# Patient Record
Sex: FEMALE | Race: WHITE | Employment: FULL TIME | URBAN - METROPOLITAN AREA
[De-identification: names, ages, dates, MRNs, and addresses within clinical notes are randomized per-mention and may not be internally consistent; named-entity substitution may affect disease eponyms.]

---

## 2018-04-19 ENCOUNTER — OFFICE VISIT (OUTPATIENT)
Dept: OBGYN CLINIC | Age: 28
End: 2018-04-19
Payer: COMMERCIAL

## 2018-04-19 ENCOUNTER — HOSPITAL ENCOUNTER (OUTPATIENT)
Age: 28
Setting detail: SPECIMEN
Discharge: HOME | End: 2018-04-19
Payer: COMMERCIAL

## 2018-04-19 VITALS
SYSTOLIC BLOOD PRESSURE: 131 MMHG | WEIGHT: 174.6 LBS | HEART RATE: 61 BPM | DIASTOLIC BLOOD PRESSURE: 80 MMHG | BODY MASS INDEX: 26.46 KG/M2 | HEIGHT: 68 IN

## 2018-04-19 DIAGNOSIS — R87.612 LGSIL OF CERVIX OF UNDETERMINED SIGNIFICANCE: Primary | ICD-10-CM

## 2018-04-19 DIAGNOSIS — Z32.02 NEGATIVE PREGNANCY TEST: ICD-10-CM

## 2018-04-19 LAB
CONTROL: PRESENT
PREGNANCY TEST URINE, POC: NEGATIVE

## 2018-04-19 PROCEDURE — 57456 ENDOCERV CURETTAGE W/SCOPE: CPT | Performed by: OBSTETRICS & GYNECOLOGY

## 2018-04-19 PROCEDURE — 81025 URINE PREGNANCY TEST: CPT | Performed by: OBSTETRICS & GYNECOLOGY

## 2018-04-19 RX ORDER — LABETALOL 200 MG/1
200 TABLET, FILM COATED ORAL DAILY
COMMUNITY
End: 2021-03-02 | Stop reason: SDUPTHER

## 2018-04-24 LAB — SURGICAL PATHOLOGY REPORT: NORMAL

## 2019-10-28 ENCOUNTER — HOSPITAL ENCOUNTER (OUTPATIENT)
Age: 29
Discharge: HOME | End: 2019-10-28
Payer: COMMERCIAL

## 2019-10-28 LAB — RUBV IGG SER QL: 192.2 IU/ML

## 2019-10-28 PROCEDURE — 86762 RUBELLA ANTIBODY: CPT

## 2019-10-28 PROCEDURE — 86765 RUBEOLA ANTIBODY: CPT

## 2019-10-28 PROCEDURE — 86735 MUMPS ANTIBODY: CPT

## 2019-10-28 PROCEDURE — 86481 TB AG RESPONSE T-CELL SUSP: CPT

## 2019-10-28 PROCEDURE — 86787 VARICELLA-ZOSTER ANTIBODY: CPT

## 2019-10-30 LAB
MEASLES IMMUNE (IGG): 5.84
MUV IGG SER QL: 5.81
VZV IGG SER QL IA: 1.92

## 2019-11-01 LAB — T-SPOT TB TEST: NORMAL

## 2020-02-03 ENCOUNTER — OFFICE VISIT (OUTPATIENT)
Dept: OBGYN CLINIC | Age: 30
End: 2020-02-03
Payer: COMMERCIAL

## 2020-02-03 ENCOUNTER — HOSPITAL ENCOUNTER (OUTPATIENT)
Age: 30
Setting detail: SPECIMEN
Discharge: HOME | End: 2020-02-03
Payer: COMMERCIAL

## 2020-02-03 VITALS
BODY MASS INDEX: 27.13 KG/M2 | HEART RATE: 74 BPM | WEIGHT: 179 LBS | DIASTOLIC BLOOD PRESSURE: 80 MMHG | HEIGHT: 68 IN | SYSTOLIC BLOOD PRESSURE: 130 MMHG

## 2020-02-03 PROCEDURE — 99395 PREV VISIT EST AGE 18-39: CPT | Performed by: ADVANCED PRACTICE MIDWIFE

## 2020-02-03 ASSESSMENT — ENCOUNTER SYMPTOMS
GASTROINTESTINAL NEGATIVE: 1
RESPIRATORY NEGATIVE: 1
EYES NEGATIVE: 1
ALLERGIC/IMMUNOLOGIC NEGATIVE: 1

## 2020-02-03 NOTE — PROGRESS NOTES
Subjective:      Patient ID: Jamie Oliver is a 26 y.o. female. Arias Eddy is here today for her annual gyn exam.  She is doing well. Takes Lo Loestrin for OCP cycle control. Takes continuous. Working at [employer] 25 now. Review of Systems   Constitutional: Negative. HENT: Negative. Eyes: Negative. Respiratory: Negative. Cardiovascular: Negative. Gastrointestinal: Negative. Endocrine: Negative. Genitourinary: Negative. Musculoskeletal: Negative. Skin: Negative. Allergic/Immunologic: Negative. Neurological: Negative. Hematological: Negative. Psychiatric/Behavioral: Negative. Objective:   Physical Exam  Constitutional:       Appearance: She is well-developed. HENT:      Head: Normocephalic and atraumatic. Eyes:      Pupils: Pupils are equal, round, and reactive to light. Neck:      Musculoskeletal: Normal range of motion and neck supple. Thyroid: No thyromegaly. Trachea: No tracheal deviation. Cardiovascular:      Rate and Rhythm: Normal rate and regular rhythm. Heart sounds: Normal heart sounds. Pulmonary:      Effort: Pulmonary effort is normal.      Breath sounds: Normal breath sounds. Chest:      Breasts: Breasts are symmetrical.         Right: No mass, nipple discharge, skin change or tenderness. Left: No mass, nipple discharge, skin change or tenderness. Abdominal:      Palpations: Abdomen is soft. Tenderness: There is no abdominal tenderness. Genitourinary:     Exam position: Supine. Labia:         Right: No rash, tenderness, lesion or injury. Left: No rash, tenderness, lesion or injury. Vagina: Normal. No vaginal discharge, erythema, tenderness or bleeding. Cervix: No cervical motion tenderness or discharge. Uterus: Not tender. Adnexa:         Right: No mass or tenderness. Left: No mass or tenderness. Rectum: Normal.   Musculoskeletal: Normal range of motion.

## 2020-02-18 LAB — CYTOLOGY REPORT: NORMAL

## 2020-09-03 ENCOUNTER — EMPLOYEE WELLNESS (OUTPATIENT)
Dept: OTHER | Age: 30
End: 2020-09-03

## 2020-09-03 LAB
CHOLESTEROL/HDL RATIO: 4.7
CHOLESTEROL: 185 MG/DL
GLUCOSE BLD-MCNC: 94 MG/DL (ref 70–99)
HDLC SERPL-MCNC: 39 MG/DL
LDL CHOLESTEROL: 107 MG/DL (ref 0–130)
PATIENT FASTING?: YES
TRIGL SERPL-MCNC: 197 MG/DL
VLDLC SERPL CALC-MCNC: ABNORMAL MG/DL (ref 1–30)

## 2020-10-19 VITALS — BODY MASS INDEX: 27.37 KG/M2 | WEIGHT: 180 LBS

## 2021-01-11 ENCOUNTER — OFFICE VISIT (OUTPATIENT)
Dept: OBGYN CLINIC | Age: 31
End: 2021-01-11
Payer: COMMERCIAL

## 2021-01-11 ENCOUNTER — HOSPITAL ENCOUNTER (OUTPATIENT)
Age: 31
Setting detail: SPECIMEN
Discharge: HOME | End: 2021-01-11
Payer: COMMERCIAL

## 2021-01-11 VITALS
DIASTOLIC BLOOD PRESSURE: 95 MMHG | SYSTOLIC BLOOD PRESSURE: 145 MMHG | HEIGHT: 68 IN | HEART RATE: 65 BPM | BODY MASS INDEX: 27.57 KG/M2 | WEIGHT: 181.9 LBS | TEMPERATURE: 97.9 F

## 2021-01-11 DIAGNOSIS — Z11.3 SCREENING FOR STDS (SEXUALLY TRANSMITTED DISEASES): ICD-10-CM

## 2021-01-11 DIAGNOSIS — Z11.3 SCREENING FOR STDS (SEXUALLY TRANSMITTED DISEASES): Primary | ICD-10-CM

## 2021-01-11 PROCEDURE — 99213 OFFICE O/P EST LOW 20 MIN: CPT | Performed by: ADVANCED PRACTICE MIDWIFE

## 2021-01-11 NOTE — PROGRESS NOTES
Denice Harley is here today for evaluation for STDs. Her SO had some dysuria and went to urgent care. They automatically screened him for STDs and told him to have his partner checked also. They have been together for 5 years and neither admits to infidelity since they have been together. She denies any symptoms. She called this am to get in. Cultures were obtained. Will await results and follow as needed.

## 2021-01-12 ENCOUNTER — TELEPHONE (OUTPATIENT)
Dept: OBGYN CLINIC | Age: 31
End: 2021-01-12

## 2021-01-12 DIAGNOSIS — A74.9 CHLAMYDIA: Primary | ICD-10-CM

## 2021-01-12 DIAGNOSIS — N76.0 ACUTE VAGINITIS: ICD-10-CM

## 2021-01-12 LAB
C. TRACHOMATIS DNA ,URINE: ABNORMAL
DIRECT EXAM: NORMAL
Lab: NORMAL
N. GONORRHOEAE DNA, URINE: NEGATIVE
SPECIMEN DESCRIPTION: ABNORMAL
SPECIMEN DESCRIPTION: NORMAL

## 2021-01-12 RX ORDER — FLUCONAZOLE 150 MG/1
150 TABLET ORAL ONCE
Qty: 1 TABLET | Refills: 0 | Status: SHIPPED | OUTPATIENT
Start: 2021-01-12 | End: 2021-01-12

## 2021-01-12 RX ORDER — AZITHROMYCIN 500 MG/1
1000 TABLET, FILM COATED ORAL ONCE
Qty: 2 TABLET | Refills: 0 | Status: SHIPPED | OUTPATIENT
Start: 2021-01-12 | End: 2021-01-12

## 2021-01-12 NOTE — TELEPHONE ENCOUNTER
Pt calling for results of labs from yesterday. 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

## 2021-02-10 ENCOUNTER — HOSPITAL ENCOUNTER (OUTPATIENT)
Age: 31
Setting detail: SPECIMEN
Discharge: HOME | End: 2021-02-10
Payer: COMMERCIAL

## 2021-02-10 ENCOUNTER — OFFICE VISIT (OUTPATIENT)
Dept: OBGYN CLINIC | Age: 31
End: 2021-02-10
Payer: COMMERCIAL

## 2021-02-10 VITALS
WEIGHT: 184.3 LBS | TEMPERATURE: 97.7 F | DIASTOLIC BLOOD PRESSURE: 82 MMHG | HEIGHT: 68 IN | SYSTOLIC BLOOD PRESSURE: 130 MMHG | HEART RATE: 66 BPM | BODY MASS INDEX: 27.93 KG/M2

## 2021-02-10 DIAGNOSIS — Z11.3 SCREEN FOR SEXUALLY TRANSMITTED DISEASES: ICD-10-CM

## 2021-02-10 DIAGNOSIS — Z23 NEED FOR TDAP VACCINATION: ICD-10-CM

## 2021-02-10 DIAGNOSIS — Z01.419 WELL WOMAN EXAM WITH ROUTINE GYNECOLOGICAL EXAM: Primary | ICD-10-CM

## 2021-02-10 PROCEDURE — 90715 TDAP VACCINE 7 YRS/> IM: CPT | Performed by: ADVANCED PRACTICE MIDWIFE

## 2021-02-10 PROCEDURE — 90471 IMMUNIZATION ADMIN: CPT | Performed by: ADVANCED PRACTICE MIDWIFE

## 2021-02-10 PROCEDURE — 99395 PREV VISIT EST AGE 18-39: CPT | Performed by: ADVANCED PRACTICE MIDWIFE

## 2021-02-10 SDOH — ECONOMIC STABILITY: TRANSPORTATION INSECURITY
IN THE PAST 12 MONTHS, HAS LACK OF TRANSPORTATION KEPT YOU FROM MEETINGS, WORK, OR FROM GETTING THINGS NEEDED FOR DAILY LIVING?: NO

## 2021-02-10 SDOH — ECONOMIC STABILITY: FOOD INSECURITY: WITHIN THE PAST 12 MONTHS, THE FOOD YOU BOUGHT JUST DIDN'T LAST AND YOU DIDN'T HAVE MONEY TO GET MORE.: NEVER TRUE

## 2021-02-10 SDOH — ECONOMIC STABILITY: TRANSPORTATION INSECURITY
IN THE PAST 12 MONTHS, HAS THE LACK OF TRANSPORTATION KEPT YOU FROM MEDICAL APPOINTMENTS OR FROM GETTING MEDICATIONS?: NO

## 2021-02-10 ASSESSMENT — ANXIETY QUESTIONNAIRES
7. FEELING AFRAID AS IF SOMETHING AWFUL MIGHT HAPPEN: 0-NOT AT ALL
4. TROUBLE RELAXING: 0-NOT AT ALL
1. FEELING NERVOUS, ANXIOUS, OR ON EDGE: 0-NOT AT ALL
6. BECOMING EASILY ANNOYED OR IRRITABLE: 0-NOT AT ALL

## 2021-02-10 ASSESSMENT — PATIENT HEALTH QUESTIONNAIRE - PHQ9
1. LITTLE INTEREST OR PLEASURE IN DOING THINGS: 0
SUM OF ALL RESPONSES TO PHQ QUESTIONS 1-9: 0

## 2021-02-10 NOTE — PROGRESS NOTES
After obtaining consent, and per orders of [facility] CNM, injection of Boostrix Tdap given in Left deltoid by Bhupendra Hensley. Patient instructed to remain in clinic for 20 minutes afterwards, and to report any adverse reaction to me immediately.
Tenderness: There is no abdominal tenderness. Genitourinary:     Exam position: Supine. Labia:         Right: No rash, tenderness, lesion or injury. Left: No rash, tenderness, lesion or injury. Vagina: Normal. No vaginal discharge, erythema, tenderness or bleeding. Cervix: No cervical motion tenderness or discharge. Uterus: Not tender. Adnexa:         Right: No mass or tenderness. Left: No mass or tenderness. Rectum: Normal.   Musculoskeletal: Normal range of motion. Lymphadenopathy:      Cervical: No cervical adenopathy. Lower Body: No right inguinal adenopathy. No left inguinal adenopathy. Skin:     General: Skin is warm and dry. Findings: No rash. Neurological:      Mental Status: She is alert and oriented to person, place, and time. Psychiatric:         Behavior: Behavior normal.         Thought Content: Thought content normal.         Judgment: Judgment normal.           An electronic signature was used to authenticate this note.     --TREY Diaz CNM

## 2021-02-11 DIAGNOSIS — Z11.3 SCREEN FOR SEXUALLY TRANSMITTED DISEASES: ICD-10-CM

## 2021-02-11 LAB
CHLAMYDIA BY THIN PREP: NEGATIVE
N. GONORRHOEAE DNA, THIN PREP: NEGATIVE
SPECIMEN DESCRIPTION: NORMAL

## 2021-02-12 LAB
HPV SAMPLE: NORMAL
HPV, GENOTYPE 16: NOT DETECTED
HPV, GENOTYPE 18: NOT DETECTED
HPV, HIGH RISK OTHER: NOT DETECTED
HPV, INTERPRETATION: NORMAL
SPECIMEN DESCRIPTION: NORMAL

## 2021-02-22 LAB — CYTOLOGY REPORT: NORMAL

## 2021-03-02 ENCOUNTER — OFFICE VISIT (OUTPATIENT)
Dept: PRIMARY CARE CLINIC | Age: 31
End: 2021-03-02
Payer: COMMERCIAL

## 2021-03-02 ENCOUNTER — HOSPITAL ENCOUNTER (OUTPATIENT)
Age: 31
Setting detail: SPECIMEN
Discharge: HOME | End: 2021-03-02
Payer: COMMERCIAL

## 2021-03-02 VITALS
WEIGHT: 186.4 LBS | TEMPERATURE: 96.7 F | BODY MASS INDEX: 28.25 KG/M2 | DIASTOLIC BLOOD PRESSURE: 82 MMHG | HEART RATE: 79 BPM | OXYGEN SATURATION: 99 % | SYSTOLIC BLOOD PRESSURE: 138 MMHG | HEIGHT: 68 IN

## 2021-03-02 DIAGNOSIS — Z13.220 SCREENING FOR HYPERLIPIDEMIA: ICD-10-CM

## 2021-03-02 DIAGNOSIS — M72.2 PLANTAR FASCIITIS: ICD-10-CM

## 2021-03-02 DIAGNOSIS — Z13.1 SCREENING FOR DIABETES MELLITUS: ICD-10-CM

## 2021-03-02 DIAGNOSIS — K21.9 GASTROESOPHAGEAL REFLUX DISEASE WITHOUT ESOPHAGITIS: ICD-10-CM

## 2021-03-02 DIAGNOSIS — I10 ESSENTIAL HYPERTENSION: Primary | Chronic | ICD-10-CM

## 2021-03-02 PROBLEM — L42 PITYRIASIS ROSEA: Status: ACTIVE | Noted: 2021-03-02

## 2021-03-02 PROBLEM — G43.009 MIGRAINE WITHOUT AURA, NOT REFRACTORY: Status: RESOLVED | Noted: 2021-03-02 | Resolved: 2021-03-02

## 2021-03-02 PROBLEM — G43.009 MIGRAINE WITHOUT AURA, NOT REFRACTORY: Status: ACTIVE | Noted: 2021-03-02

## 2021-03-02 LAB
ANION GAP SERPL CALCULATED.3IONS-SCNC: 9 MMOL/L (ref 9–17)
BUN BLDV-MCNC: 12 MG/DL (ref 6–20)
BUN/CREAT BLD: ABNORMAL (ref 9–20)
CALCIUM SERPL-MCNC: 9.2 MG/DL (ref 8.6–10.4)
CHLORIDE BLD-SCNC: 101 MMOL/L (ref 98–107)
CHOLESTEROL/HDL RATIO: 4.9
CHOLESTEROL: 193 MG/DL
CO2: 23 MMOL/L (ref 20–31)
CREAT SERPL-MCNC: 0.71 MG/DL (ref 0.5–0.9)
GFR AFRICAN AMERICAN: >60 ML/MIN
GFR NON-AFRICAN AMERICAN: >60 ML/MIN
GFR SERPL CREATININE-BSD FRML MDRD: ABNORMAL ML/MIN/{1.73_M2}
GFR SERPL CREATININE-BSD FRML MDRD: ABNORMAL ML/MIN/{1.73_M2}
GLUCOSE BLD-MCNC: 106 MG/DL (ref 70–99)
HDLC SERPL-MCNC: 39 MG/DL
LDL CHOLESTEROL: 116 MG/DL (ref 0–130)
POTASSIUM SERPL-SCNC: 4.3 MMOL/L (ref 3.7–5.3)
SODIUM BLD-SCNC: 133 MMOL/L (ref 135–144)
TRIGL SERPL-MCNC: 191 MG/DL
VLDLC SERPL CALC-MCNC: ABNORMAL MG/DL (ref 1–30)

## 2021-03-02 PROCEDURE — 99203 OFFICE O/P NEW LOW 30 MIN: CPT | Performed by: NURSE PRACTITIONER

## 2021-03-02 RX ORDER — LABETALOL 200 MG/1
200 TABLET, FILM COATED ORAL DAILY
Qty: 30 TABLET | Refills: 3 | Status: SHIPPED | OUTPATIENT
Start: 2021-03-02 | End: 2021-09-20 | Stop reason: SDUPTHER

## 2021-03-02 RX ORDER — HYDROCHLOROTHIAZIDE 25 MG/1
12.5 TABLET ORAL DAILY
Qty: 30 TABLET | Refills: 3 | Status: SHIPPED | OUTPATIENT
Start: 2021-03-02 | End: 2021-08-17

## 2021-03-02 ASSESSMENT — ENCOUNTER SYMPTOMS
RESPIRATORY NEGATIVE: 1
ABDOMINAL PAIN: 1
EYES NEGATIVE: 1

## 2021-03-02 NOTE — PROGRESS NOTES
Emily Gonzales [address] [place] [address]  Hannah Jenkins [id number]  Dept: [phone]     Patient is here to establish care. Here for evaluation of the following medical concerns:New Patient and Hypertension    Jennifer Aldrich is a 27 y.o. female who presents to the office today for a first visit and to establish a relationship with a new primary care provider. Pt here to establish care. She has HTN and has been on labetalol BID but notices she gets dizzy/lightheaded when taking it BID. Her pulse rate tends to run on the lower side. Pt is considering pregnancy soon so her previous PCP elected for a BP med that would be safe for pregnancy. Her BP is borderline in office, she has taken the labetalol this morning. We discussed decreasing labetalol to once daily and adding 12.5mg hctz and having pt monitor her readings, pt agreeable. She also has noticed left heel pain, intermittently, some days worse than others. She does work 12-13 hour shifts and does a lot of standing. No injury. Discussed plantar fascitis and gave recommendations for good sole support as well as exercises she can do. Pt notices within last few weeks she is getting abdominal cramping after eating meals. She hasn't noticed any specific food that triggers it, but she does eat a lot of dairy. She recently started taking a probiotic and metamucil so will see if symptoms improve with those. Denies constipation that she knows of, has BM daily, denies diarrhea. Has GERD which is managed with daily nexium. Pt also due for labwork. Will start new meds, get labs and f/u in 2 weeks, pt understands and agreeable to plan, will call me in meantime with any questions or issues.      Patient Active Problem List    Diagnosis Date Noted    Essential hypertension 03/02/2021    Pityriasis rosea 03/02/2021    Gastroesophageal reflux disease without esophagitis 03/02/2021    Plantar fasciitis 03/02/2021     Patient's BMI is Body mass index is 28.34 kg/m². Social History     Tobacco Use    Smoking status: Never Smoker    Smokeless tobacco: Never Used   Substance Use Topics    Alcohol use: No    Drug use: No      Past Medical History:   Diagnosis Date    Dislocation, hip ([address]     Murmur     Vasovagal near syncope       No data recorded   Immunization:  Immunization History   Administered Date(s) Administered    Hepatitis B Ped/Adol (Engerix-B, Recombivax HB) 03/29/1996, 06/06/1996, 02/07/1997    Influenza Virus Vaccine 07/30/2015, 08/29/2016    Influenza, Quadv, IM, PF (6 mo and older Fluzone, Flulaval, Fluarix, and 3 yrs and older Afluria) 10/21/2019, 09/21/2020    MMR 03/24/1999    Td vaccine (adult) 12/03/2002    Tdap (Boostrix, Adacel) 11/04/2019, 02/10/2021     History reviewed. No pertinent surgical history. Family History   Problem Relation Age of Onset    High Blood Pressure Mother     High Blood Pressure Father     Other Father 27        [facility]     Current Outpatient Medications   Medication Sig Dispense Refill    labetalol (NORMODYNE) 200 MG tablet Take 1 tablet by mouth daily 30 tablet 3    hydroCHLOROthiazide (HYDRODIURIL) 25 MG tablet Take 0.5 tablets by mouth daily 30 tablet 3    esomeprazole (NEXIUM) 20 MG delayed release capsule Take 1 capsule by mouth daily 30 capsule 3    Cetirizine HCl (ZYRTEC PO) Take by mouth      Probiotic Product (PROBIOTIC PO) Take by mouth      norethindrone-ethinyl estradiol-Fe (LO LOESTRIN FE) 1 MG-10 MCG / 10 MCG tablet Take 1 tablet by mouth daily 28 tablet 12    ibuprofen (ADVIL;MOTRIN) 800 MG tablet Take 800 mg by mouth every 6 hours as needed for Pain       No current facility-administered medications for this visit. The patient's past medical, surgical, social, and family history as well as her current medications and allergies were reviewed as documented in today's encounter. Review of Systems   Constitutional: Negative. HENT: Negative. Eyes: Negative. Respiratory: Negative. Cardiovascular: Negative. Gastrointestinal: Positive for abdominal pain (intermittent cramping after eating ). Endocrine: Negative. Genitourinary: Negative. Musculoskeletal: Positive for myalgias (left heel pain). Skin: Negative. Allergic/Immunologic: Positive for environmental allergies. Neurological: Negative. Hematological: Negative. Psychiatric/Behavioral: Negative. All other systems reviewed and are negative. /82 (Site: Left Upper Arm, Position: Sitting, Cuff Size: Medium Adult)   Pulse 79   Temp 96.7 °F (35.9 °C) (Temporal)   Ht 5' 8\" (1.727 m)   Wt 186 lb 6.4 oz (84.6 kg)   SpO2 99%   BMI 28.34 kg/m²   Physical Exam  Vitals signs and nursing note reviewed. Constitutional:       Appearance: Normal appearance. HENT:      Right Ear: Tympanic membrane, ear canal and external ear normal.      Left Ear: Tympanic membrane, ear canal and external ear normal.      Nose: Nose normal.      Mouth/Throat:      Mouth: Mucous membranes are moist.      Pharynx: No oropharyngeal exudate. Eyes:      Extraocular Movements: Extraocular movements intact. Conjunctiva/sclera: Conjunctivae normal.      Pupils: Pupils are equal, round, and reactive to light. Neck:      Musculoskeletal: Normal range of motion. Cardiovascular:      Rate and Rhythm: Normal rate and regular rhythm. Pulses: Normal pulses. Heart sounds: Normal heart sounds. Pulmonary:      Effort: Pulmonary effort is normal. No respiratory distress. Breath sounds: Normal breath sounds. No stridor. No wheezing. Abdominal:      General: Abdomen is flat. Bowel sounds are normal. There is no distension. Palpations: Abdomen is soft. There is no mass. Tenderness: There is no abdominal tenderness. There is no rebound. Musculoskeletal: Normal range of motion. Skin:     General: Skin is warm and dry. Capillary Refill: Capillary refill takes less than 2 seconds. Neurological:      General: No focal deficit present. Mental Status: She is alert. Psychiatric:         Mood and Affect: Mood normal.         Behavior: Behavior normal.         Thought Content: Thought content normal.       Lab Results   Component Value Date    WBC 6.5 03/30/2016    HGB 12.3 03/30/2016    HCT 38.8 03/30/2016    MCV 90.2 03/30/2016     03/30/2016     Lab Results   Component Value Date     03/30/2016    K 4.4 03/30/2016     03/30/2016    CO2 24 03/30/2016    BUN 14 03/30/2016    CREATININE 0.77 03/30/2016    GLUCOSE 94 09/03/2020    CALCIUM 9.1 03/30/2016      No results found for: ALT, AST, GGT, ALKPHOS, BILITOT  No results found for: TSHFT4, TSH  Lab Results   Component Value Date    CHOL 185 09/03/2020     Lab Results   Component Value Date    TRIG 197 (H) 09/03/2020     Lab Results   Component Value Date    HDL 39 (L) 09/03/2020     Lab Results   Component Value Date    LDLCHOLESTEROL 107 09/03/2020     Lab Results   Component Value Date    CHOLHDLRATIO 4.7 09/03/2020     No results found for: LABA1C  No results found for: [id number]  No results found for: FOLATE  No results found for: IRON, TIBC, FERRITIN  No results found for: VITD25  I personally reviewed testing with patient. Otherwise labs within normal limits  ASSESSMENT AND PLAN  1. Screening for diabetes mellitus    - Basic Metabolic Panel; Future    2. Screening for hyperlipidemia    - Lipid Panel; Future    3. Essential hypertension    - labetalol (NORMODYNE) 200 MG tablet; Take 1 tablet by mouth daily  Dispense: 30 tablet; Refill: 3  - hydroCHLOROthiazide (HYDRODIURIL) 25 MG tablet; Take 0.5 tablets by mouth daily  Dispense: 30 tablet; Refill: 3    4. Gastroesophageal reflux disease without esophagitis    - esomeprazole (NEXIUM) 20 MG delayed release capsule; Take 1 capsule by mouth daily  Dispense: 30 capsule; Refill: 3    5.  Plantar fasciitis       Orders Placed This Encounter   Procedures    Basic Metabolic Panel     Standing Status:   Future     Number of Occurrences:   1     Standing Expiration Date:   3/2/2022    Lipid Panel     Standing Status:   Future     Number of Occurrences:   1     Standing Expiration Date:   3/2/2022     Order Specific Question:   Is Patient Fasting?/# of Hours     Answer:   10 hours fasting     Orders Placed This Encounter   Medications    labetalol (NORMODYNE) 200 MG tablet     Sig: Take 1 tablet by mouth daily     Dispense:  30 tablet     Refill:  3    hydroCHLOROthiazide (HYDRODIURIL) 25 MG tablet     Sig: Take 0.5 tablets by mouth daily     Dispense:  30 tablet     Refill:  3    esomeprazole (NEXIUM) 20 MG delayed release capsule     Sig: Take 1 capsule by mouth daily     Dispense:  30 capsule     Refill:  3      Data Unavailable  Health Maintenance Due   Topic Date Due    Potassium monitoring  03/30/2017    Creatinine monitoring  03/30/2017      Medications Discontinued During This Encounter   Medication Reason    labetalol (NORMODYNE) 200 MG tablet REORDER    Esomeprazole Magnesium (NEXIUM PO) REORDER     The patient's past medical, surgical, social, and family history as well as her   current medications and allergies were reviewed as documented in today's encounter. Medications, labs, diagnostic studies, consultations and follow-up as documented in this encounter. Return in about 2 weeks (around 3/16/2021). Future Appointments   Date Time Provider Gianna Cornejo   3/16/2021  8:45 AM TREY Parson CNP Jojo Door     This note was completed by using the assistance of a speech-recognition program. However, inadvertent computerized transcription errors may be present. Although every effort was made to ensure accuracy, no guarantees can be provided that every mistake has been identified and corrected by editing.   Electronically signed by Cherylene Maine, APRN - CNP on 3/2/2021 at 9:59 AM

## 2021-03-02 NOTE — PATIENT INSTRUCTIONS
Patient Education        Plantar Fasciitis: Exercises  Introduction  Here are some examples of exercises for you to try. The exercises may be suggested for a condition or for rehabilitation. Start each exercise slowly. Ease off the exercises if you start to have pain. You will be told when to start these exercises and which ones will work best for you. How to do the exercises  Towel stretch   1. Sit with your legs extended and knees straight. 2. Place a towel around your foot just under the toes. 3. Hold each end of the towel in each hand, with your hands above your knees. 4. Pull back with the towel so that your foot stretches toward you. 5. Hold the position for at least 15 to 30 seconds. 6. Repeat 2 to 4 times a session, up to 5 sessions a day. Calf stretch   This exercise stretches the muscles at the back of the lower leg (the calf) and the Achilles tendon. Do this exercise 3 or 4 times a day, 5 days a week. 1. Stand facing a wall with your hands on the wall at about eye level. Put the leg you want to stretch about a step behind your other leg. 2. Keeping your back heel on the floor, bend your front knee until you feel a stretch in the back leg. 3. Hold the stretch for 15 to 30 seconds. Repeat 2 to 4 times. Plantar fascia and calf stretch   Stretching the plantar fascia and calf muscles can increase flexibility and decrease heel pain. You can do this exercise several times each day and before and after activity. 1. Stand on a step as shown above. Be sure to hold on to the banister. 2. Slowly let your heels down over the edge of the step as you relax your calf muscles. You should feel a gentle stretch across the bottom of your foot and up the back of your leg to your knee. 3. Hold the stretch about 15 to 30 seconds, and then tighten your calf muscle a little to bring your heel back up to the level of the step. Repeat 2 to 4 times.     Towel curls   Make this exercise more challenging by placing a weighted object, such as a soup can, on the other end of the towel. 1. While sitting, place your foot on a towel on the floor and scrunch the towel toward you with your toes. 2. Then, also using your toes, push the towel away from you. [place] pickups   1. Put marbles on the floor next to a cup.  2. Using your toes, try to lift the marbles up from the floor and put them in the cup. Follow-up care is a key part of your treatment and safety. Be sure to make and go to all appointments, and call your doctor if you are having problems. It's also a good idea to know your test results and keep a list of the medicines you take. Where can you learn more? Go to https://Nefsis.[employer]. org and sign in to your [employer] account. Enter D741 in the [employer] box to learn more about \"Plantar Fasciitis: Exercises. \"     If you do not have an account, please click on the \"Sign Up Now\" link. Current as of: March 2, 2020               Content Version: 12.6  © 0783-6700 [employer]. Care instructions adapted under license by [facility] ([facility]). If you have questions about a medical condition or this instruction, always ask your healthcare professional. [place] [address] any warranty or liability for your use of this information. Patient Education        Plantar Fasciitis: Care Instructions  Your Care Instructions     Plantar fasciitis is pain and inflammation of the plantar fascia, the tissue at the bottom of your foot that connects the heel bone to the toes. The plantar fascia also supports the arch. If you strain the plantar fascia, it can develop small tears and cause heel pain when you stand or walk. Plantar fasciitis can be caused by running or other sports. It also may occur in people who are overweight or who have high arches or flat feet.  You may get plantar fasciitis if you walk or stand for long periods, or have a tight Achilles tendon or calf muscles. You can improve your foot pain with rest and other care at home. It might take a few weeks to a few months for your foot to heal completely. Follow-up care is a key part of your treatment and safety. Be sure to make and go to all appointments, and call your doctor if you are having problems. It's also a good idea to know your test results and keep a list of the medicines you take. How can you care for yourself at home? · Rest your feet often. Reduce your activity to a level that lets you avoid pain. If possible, do not run or walk on hard surfaces. · Take pain medicines exactly as directed. ? If the doctor gave you a prescription medicine for pain, take it as prescribed. ? If you are not taking a prescription pain medicine, take an over-the-counter anti-inflammatory medicine for pain and swelling, such as ibuprofen (Advil, Motrin) or naproxen (Aleve). Read and follow all instructions on the label. · Use ice massage to help with pain and swelling. You can use an ice cube or an ice cup several times a day. To make an ice cup, fill a paper cup with water and freeze it. Cut off the top of the cup until a half-inch of ice shows. Hold onto the remaining paper to use the cup. Rub the ice in small circles over the area for 5 to 7 minutes. · Contrast baths, which alternate hot and cold water, can also help reduce swelling. But because heat alone may make pain and swelling worse, end a contrast bath with a soak in cold water. · Wear a night splint if your doctor suggests it. A night splint holds your foot with the toes pointed up and the foot and ankle at a 90-degree angle. This position gives the bottom of your foot a constant, gentle stretch. · Do simple exercises such as calf stretches and towel stretches 2 to 3 times each day, especially when you first get up in the morning. These can help the plantar fascia become more flexible. They also make the muscles that support your arch stronger.  Hold these stretches for 15 to 30 seconds per stretch. Repeat 2 to 4 times. ? Stand about 1 foot from a wall. Place the palms of both hands against the wall at chest level. Lean forward against the wall, keeping one leg with the knee straight and heel on the ground while bending the knee of the other leg.  ? Sit down on the floor or a mat with your feet stretched in front of you. Roll up a towel lengthwise, and loop it over the ball of your foot. Holding the towel at both ends, gently pull the towel toward you to stretch your foot. · Wear shoes with good arch support. Athletic shoes or shoes with a well-cushioned sole are good choices. · Try heel cups or shoe inserts (orthotics) to help cushion your heel. You can buy these at many shoe stores. · Put on your shoes as soon as you get out of bed. Going barefoot or wearing slippers may make your pain worse. · Reach and stay at a good weight for your height. This puts less strain on your feet. When should you call for help? Call your doctor now or seek immediate medical care if:    · You have heel pain with fever, redness, or warmth in your heel.     · You cannot put weight on the sore foot. Watch closely for changes in your health, and be sure to contact your doctor if:    · You have numbness or tingling in your heel.     · Your heel pain lasts more than 2 weeks. Where can you learn more? Go to https://Gennio.[employer]. org and sign in to your Let[employer] Talk account. Enter C050 in the [facility] box to learn more about \"Plantar Fasciitis: Care Instructions. \"     If you do not have an account, please click on the \"Sign Up Now\" link. Current as of: March 2, 2020               Content Version: 12.6  © 3174-6122 [employer]. Care instructions adapted under license by [facility] ([facility]).  If you have questions about a medical condition or this instruction, always ask your healthcare professional. Pedro Pablo Moreira disclaims any warranty or liability for your use of this information.

## 2021-03-16 ENCOUNTER — OFFICE VISIT (OUTPATIENT)
Dept: PRIMARY CARE CLINIC | Age: 31
End: 2021-03-16
Payer: COMMERCIAL

## 2021-03-16 VITALS
SYSTOLIC BLOOD PRESSURE: 132 MMHG | HEIGHT: 68 IN | TEMPERATURE: 97.2 F | OXYGEN SATURATION: 97 % | HEART RATE: 70 BPM | WEIGHT: 187.8 LBS | DIASTOLIC BLOOD PRESSURE: 72 MMHG | BODY MASS INDEX: 28.46 KG/M2

## 2021-03-16 DIAGNOSIS — I10 ESSENTIAL HYPERTENSION: Primary | Chronic | ICD-10-CM

## 2021-03-16 DIAGNOSIS — E78.1 HYPERTRIGLYCERIDEMIA: ICD-10-CM

## 2021-03-16 PROCEDURE — 99213 OFFICE O/P EST LOW 20 MIN: CPT | Performed by: NURSE PRACTITIONER

## 2021-03-16 RX ORDER — CHLORAL HYDRATE 500 MG
1000 CAPSULE ORAL 2 TIMES DAILY
Qty: 60 CAPSULE | Refills: 3 | Status: SHIPPED | OUTPATIENT
Start: 2021-03-16 | End: 2022-08-17

## 2021-03-16 ASSESSMENT — ENCOUNTER SYMPTOMS
RESPIRATORY NEGATIVE: 1
GASTROINTESTINAL NEGATIVE: 1
ALLERGIC/IMMUNOLOGIC NEGATIVE: 1
EYES NEGATIVE: 1

## 2021-03-16 NOTE — PROGRESS NOTES
Emily Gonzales  MEDICINE  [address] [place] [address] [address] [address]  Dept: [phone]       Warden Mishra is a 27 y.o. female who presents to the office today for evaluation of Hypertension    Pt here for f/u for HTN and to discuss lab results. Pt BP readings have improved since we adjusted medications, no side effects. We reviewed labwork which showed slightly elevated FBS, discussed diet/exercise changes. Labwork also showed elevated triglycerides, discussed fish oil to start taking and pt agreeable. Will f/u in 6 months, contact me sooner if readings fluctuate or if pt has any questions/issues. Patient Active Problem List    Diagnosis Date Noted    Hypertriglyceridemia 03/16/2021    BMI 28.0-28.9,adult 03/16/2021    Essential hypertension 03/02/2021    Pityriasis rosea 03/02/2021    Gastroesophageal reflux disease without esophagitis 03/02/2021    Plantar fasciitis 03/02/2021     Patient's BMI is Body mass index is 28.55 kg/m². BMI was elevated today, and weight loss plan recommended is : conventional weight loss and daily exercise regimen. Social History     Tobacco Use    Smoking status: Never Smoker    Smokeless tobacco: Never Used   Substance Use Topics    Alcohol use: No    Drug use: No      Past Medical History:   Diagnosis Date    Dislocation, hip ([facility] [address]     Murmur     Vasovagal near syncope       No data recorded   Immunization:  Immunization History   Administered Date(s) Administered    Hepatitis B Ped/Adol (Engerix-B, Recombivax HB) 03/29/1996, 06/06/1996, 02/07/1997    Influenza Virus Vaccine 07/30/2015, 08/29/2016    Influenza, Quadv, IM, PF (6 mo and older Fluzone, Flulaval, Fluarix, and 3 yrs and older Afluria) 10/21/2019, 09/21/2020    MMR 03/24/1999    Td vaccine (adult) 12/03/2002    Tdap (Boostrix, Adacel) 11/04/2019, 02/10/2021     History reviewed. No pertinent surgical history.   Family History   Problem Relation Age of Onset    High Blood Pressure Mother     High Blood Pressure Father     Other Father 27        omari     Current Outpatient Medications   Medication Sig Dispense Refill    Omega-3 Fatty Acids (FISH OIL) 1000 MG CAPS Take 1 capsule by mouth 2 times daily 60 capsule 3    labetalol (NORMODYNE) 200 MG tablet Take 1 tablet by mouth daily 30 tablet 3    hydroCHLOROthiazide (HYDRODIURIL) 25 MG tablet Take 0.5 tablets by mouth daily 30 tablet 3    esomeprazole (NEXIUM) 20 MG delayed release capsule Take 1 capsule by mouth daily 30 capsule 3    Cetirizine HCl (ZYRTEC PO) Take by mouth      Probiotic Product (PROBIOTIC PO) Take by mouth      norethindrone-ethinyl estradiol-Fe (LO LOESTRIN FE) 1 MG-10 MCG / 10 MCG tablet Take 1 tablet by mouth daily 28 tablet 12    ibuprofen (ADVIL;MOTRIN) 800 MG tablet Take 800 mg by mouth every 6 hours as needed for Pain       No current facility-administered medications for this visit. The patient's past medical, surgical, social, and family history as well as her current medications and allergies were reviewed as documented in today's encounter. Review of Systems   Constitutional: Negative. HENT: Negative. Eyes: Negative. Respiratory: Negative. Cardiovascular: Negative. Gastrointestinal: Negative. Endocrine: Negative. Genitourinary: Negative. Musculoskeletal: Negative. Skin: Negative. Allergic/Immunologic: Negative. Neurological: Negative. Hematological: Negative. Psychiatric/Behavioral: Negative. All other systems reviewed and are negative. /72 (Site: Left Upper Arm, Position: Sitting, Cuff Size: Medium Adult)   Pulse 70   Temp 97.2 °F (36.2 °C) (Temporal)   Ht 5' 8\" (1.727 m)   Wt 187 lb 12.8 oz (85.2 kg)   SpO2 97%   BMI 28.55 kg/m²   Physical Exam  Vitals signs and nursing note reviewed. Constitutional:       Appearance: Normal appearance.    HENT:      Right Ear: Tympanic membrane, ear canal and external ear normal.      Left Ear: Tympanic membrane, ear canal and external ear normal.      Nose: Nose normal.      Mouth/Throat:      Mouth: Mucous membranes are moist.      Pharynx: No oropharyngeal exudate. Eyes:      Extraocular Movements: Extraocular movements intact. Conjunctiva/sclera: Conjunctivae normal.      Pupils: Pupils are equal, round, and reactive to light. Neck:      Musculoskeletal: Normal range of motion. Cardiovascular:      Rate and Rhythm: Normal rate and regular rhythm. Pulses: Normal pulses. Heart sounds: Normal heart sounds. Pulmonary:      Effort: Pulmonary effort is normal. No respiratory distress. Breath sounds: Normal breath sounds. No stridor. No wheezing. Abdominal:      General: Abdomen is flat. Bowel sounds are normal.      Palpations: Abdomen is soft. Tenderness: There is no abdominal tenderness. Musculoskeletal: Normal range of motion. Skin:     General: Skin is warm and dry. Capillary Refill: Capillary refill takes less than 2 seconds. Neurological:      General: No focal deficit present. Mental Status: She is alert. Psychiatric:         Mood and Affect: Mood normal.         Behavior: Behavior normal.         Thought Content:  Thought content normal.       Lab Results   Component Value Date    WBC 6.5 03/30/2016    HGB 12.3 03/30/2016    HCT 38.8 03/30/2016    MCV 90.2 03/30/2016     03/30/2016     Lab Results   Component Value Date     03/02/2021    K 4.3 03/02/2021     03/02/2021    CO2 23 03/02/2021    BUN 12 03/02/2021    CREATININE 0.71 03/02/2021    GLUCOSE 106 03/02/2021    CALCIUM 9.2 03/02/2021      No results found for: ALT, AST, GGT, ALKPHOS, BILITOT  No results found for: TSHFT4, TSH  Lab Results   Component Value Date    CHOL 193 03/02/2021    CHOL 185 09/03/2020     Lab Results   Component Value Date    TRIG 191 (H) 03/02/2021    TRIG 197 (H) 09/03/2020     Lab Results   Component Value Date    HDL 39 (L) 03/02/2021    HDL 39 (L) 09/03/2020     Lab Results   Component Value Date    LDLCHOLESTEROL 116 03/02/2021    LDLCHOLESTEROL 107 09/03/2020     Lab Results   Component Value Date    CHOLHDLRATIO 4.9 03/02/2021    CHOLHDLRATIO 4.7 09/03/2020     No results found for: LABA1C  No results found for: KGYJYGDI16  No results found for: FOLATE  No results found for: IRON, TIBC, FERRITIN  No results found for: VITD25  I personally reviewed testing with patient. Otherwise labs within normal limits  ASSESSMENT AND PLAN  1. Hypertriglyceridemia    - Omega-3 Fatty Acids (FISH OIL) 1000 MG CAPS; Take 1 capsule by mouth 2 times daily  Dispense: 60 capsule; Refill: 3    2. Essential hypertension       No orders of the defined types were placed in this encounter. Orders Placed This Encounter   Medications    Omega-3 Fatty Acids (FISH OIL) 1000 MG CAPS     Sig: Take 1 capsule by mouth 2 times daily     Dispense:  60 capsule     Refill:  3      Data Unavailable  There are no preventive care reminders to display for this patient. There are no discontinued medications. The patient's past medical, surgical, social, and family history as well as her   current medications and allergies were reviewed as documented in today's encounter. Medications, labs, diagnostic studies, consultations and follow-up as documented in this encounter. Return in about 6 months (around 9/16/2021). Future Appointments   Date Time Provider Gianna Cornejo   9/15/2021  9:00 AM TREY Huang CNP     This note was completed by using the assistance of a speech-recognition program. However, inadvertent computerized transcription errors may be present. Although every effort was made to ensure accuracy, no guarantees can be provided that every mistake has been identified and corrected by editing.   Electronically signed by TREY Patino CNP on 3/16/2021 at 12:14 PM

## 2021-03-23 ENCOUNTER — TELEMEDICINE (OUTPATIENT)
Dept: PRIMARY CARE CLINIC | Age: 31
End: 2021-03-23
Payer: COMMERCIAL

## 2021-03-23 DIAGNOSIS — Z20.822 SUSPECTED COVID-19 VIRUS INFECTION: Primary | ICD-10-CM

## 2021-03-23 DIAGNOSIS — R09.89 SYMPTOMS OF UPPER RESPIRATORY INFECTION (URI): ICD-10-CM

## 2021-03-23 PROCEDURE — 99213 OFFICE O/P EST LOW 20 MIN: CPT | Performed by: NURSE PRACTITIONER

## 2021-03-23 ASSESSMENT — ENCOUNTER SYMPTOMS
STRIDOR: 0
GASTROINTESTINAL NEGATIVE: 1
EYES NEGATIVE: 1
SHORTNESS OF BREATH: 0
RHINORRHEA: 1
WHEEZING: 0
COUGH: 1
CHEST TIGHTNESS: 0

## 2021-03-23 NOTE — PATIENT INSTRUCTIONS
Patient Education        Learning About Coronavirus [phone])  What is coronavirus (COVID-19)? COVID-19 is a disease caused by a new type of coronavirus. This illness was first found in December 2019. It has since spread worldwide. Coronaviruses are a large group of viruses. They cause the common cold. They also cause more serious illnesses like Middle East respiratory syndrome (MERS) and severe acute respiratory syndrome (SARS). COVID-19 is caused by a novel coronavirus. That means it's a new type that has not been seen in people before. What are the symptoms? Coronavirus (COVID-19) symptoms may include:  · Fever. · Cough. · Trouble breathing. · Chills or repeated shaking with chills. · Muscle pain. · Headache. · Sore throat. · New loss of taste or smell. · Vomiting. · Diarrhea. In severe cases, COVID-19 can cause pneumonia and make it hard to breathe without help from a machine. It can cause death. How is it diagnosed? COVID-19 is diagnosed with a viral test. This may also be called a PCR test or antigen test. It looks for evidence of the virus in your breathing passages or lungs (respiratory system). The test is most often done on a sample from the nose, throat, or lungs. It's sometimes done on a sample of saliva. One way a sample is collected is by putting a long swab into the back of your nose. How is it treated? Mild cases of COVID-19 can be treated at home. Serious cases need treatment in the hospital. Treatment may include medicines to reduce symptoms, plus breathing support such as oxygen therapy or a ventilator. Some people may be placed on their belly to help their oxygen levels. Treatments that may help people who have COVID-19 include:  Antiviral medicines. These medicines treat viral infections. Remdesivir is an example. Immune-based therapy. These medicines help the immune system fight COVID-19. One example is bamlanivimab. It's a monoclonal antibody. Blood thinners. These medicines help prevent blood clots. People with severe illness are at risk for blood clots. How can you protect yourself and others? The best way to protect yourself from getting sick is to:  · Avoid areas where there is an outbreak. · Avoid contact with people who may be infected. · Avoid crowds and try to stay at least 6 feet away from other people. · Wash your hands often, especially after you cough or sneeze. Use soap and water, and scrub for at least 20 seconds. If soap and water aren't available, use an alcohol-based hand . · Avoid touching your mouth, nose, and eyes. To help avoid spreading the virus to others:  · Stay home if you are sick or have been exposed to the virus. Don't go to school, work, or public areas. And don't use public transportation, ride-shares, or taxis unless you have no choice. · Wear a cloth face cover if you have to go to public areas. · Cover your mouth with a tissue when you cough or sneeze. Then throw the tissue in the trash and wash your hands right away. · If you're sick:  ? Leave your home only if you need to get medical care. But call the doctor's office first so they know you're coming. And wear a face cover. ? Wear the face cover whenever you're around other people. It can help stop the spread of the virus when you cough or sneeze. ? Limit contact with pets and people in your home. If possible, stay in a separate bedroom and use a separate bathroom. ? Clean and disinfect your home every day. Use household  and disinfectant wipes or sprays. Take special care to clean things that you grab with your hands. These include doorknobs, remote controls, phones, and handles on your refrigerator and microwave. And don't forget countertops, tabletops, bathrooms, and computer keyboards. When should you call for help? Call 911 anytime you think you may need emergency care.  For example, call if you have life-threatening symptoms, such as:    · You have severe trouble breathing. (You can't talk at all.)     · You have constant chest pain or pressure.     · You are severely dizzy or lightheaded.     · You are confused or can't think clearly.     · Your face and lips have a blue color.     · You pass out (lose consciousness) or are very hard to wake up. Call your doctor now or seek immediate medical care if:    · You have moderate trouble breathing. (You can't speak a full sentence.)     · You are coughing up blood (more than about 1 teaspoon).     · You have signs of low blood pressure. These include feeling lightheaded; being too weak to stand; and having cold, pale, clammy skin. Watch closely for changes in your health, and be sure to contact your doctor if:    · Your symptoms get worse.     · You are not getting better as expected. Call before you go to the doctor's office. Follow their instructions. And wear a cloth face cover. Current as of: December 18, 2020               Content Version: 12.8  © 2006-2021 [employer]. Care instructions adapted under license by [facility] ([facility]). If you have questions about a medical condition or this instruction, always ask your healthcare professional. [place] [address] any warranty or liability for your use of this information.

## 2021-03-23 NOTE — PROGRESS NOTES
3/23/2021    TELEHEALTH EVALUATION -- Audio/Visual (During CBSUS-40 public health emergency)    HPI:    Belen Miner (:  1990) has requested an audio/video evaluation for the following concern(s):    Pt c/o productive cough, nasal congestion, headache, body aches, boyfriend tested positive for covid-19 yesterday, pt was tested yesterday but does not have results yet. She has been taking tylenol but hasn't taken anything else OTC. Symptoms started  night. Denies chest pain, denies SOB. Review of Systems   Constitutional: Positive for fatigue. HENT: Positive for congestion, postnasal drip and rhinorrhea. Eyes: Negative. Respiratory: Positive for cough. Negative for chest tightness, shortness of breath, wheezing and stridor. Cardiovascular: Negative. Negative for chest pain and palpitations. Gastrointestinal: Negative. Endocrine: Negative. Genitourinary: Negative. Musculoskeletal: Negative. Skin: Negative. Allergic/Immunologic: Positive for environmental allergies. Neurological: Positive for headaches. Hematological: Negative. Psychiatric/Behavioral: Negative. All other systems reviewed and are negative. Prior to Visit Medications    Medication Sig Taking?  Authorizing Provider   Omega-3 Fatty Acids (FISH OIL) 1000 MG CAPS Take 1 capsule by mouth 2 times daily Yes TREY Huntley CNP   labetalol (NORMODYNE) 200 MG tablet Take 1 tablet by mouth daily Yes TREY Huntley CNP   hydroCHLOROthiazide (HYDRODIURIL) 25 MG tablet Take 0.5 tablets by mouth daily Yes TREY Huntley CNP   esomeprazole (NEXIUM) 20 MG delayed release capsule Take 1 capsule by mouth daily Yes TREY Huntley CNP   Cetirizine HCl (ZYRTEC PO) Take by mouth Yes Historical Provider, MD   Probiotic Product (PROBIOTIC PO) Take by mouth Yes Historical Provider, MD   norethindrone-ethinyl estradiol-Fe (LO LOESTRIN FE) 1 MG-10 MCG / 10 MCG tablet Take 1 tablet by mouth daily Yes Hallucinations        [] Abnormal-     Other pertinent observable physical exam findings-     ASSESSMENT/PLAN:  1. Suspected COVID-19 virus infection  Pt tested yesterday, no results yet but boyfriend positive     2. Symptoms of upper respiratory infection (URI)  Discussed increased fluid intake, tylenol PRN for pain/fever, can take decongestant-but be sure to use something HBP friendly. Await covid-19 results and self quarantine until then. Call me if symptoms do not improve or worsen. Advised to be seen in ER if pt has any SOB, chest pain, difficulty breathing. Return if symptoms worsen or fail to improve. Belen Miner, was evaluated through a synchronous (real-time) audio-video encounter. The patient (or guardian if applicable) is aware that this is a billable service. Verbal consent to proceed has been obtained within the past 12 months. The visit was conducted pursuant to the emergency declaration under the [address] [place] [address], [address] [place] [address] authority and the [employer] and [employer] General Act. Patient identification was verified, and a caregiver was present when appropriate. The patient was located in a state where the provider was credentialed to provide care. Total time spent on this encounter: Not billed by time    --TREY Gonzalez CNP on 3/23/2021 at 3:26 PM    An electronic signature was used to authenticate this note.

## 2021-05-06 ENCOUNTER — PATIENT MESSAGE (OUTPATIENT)
Dept: OBGYN CLINIC | Age: 31
End: 2021-05-06

## 2021-05-06 DIAGNOSIS — Z30.41 ENCOUNTER FOR SURVEILLANCE OF CONTRACEPTIVE PILLS: Primary | ICD-10-CM

## 2021-05-06 NOTE — TELEPHONE ENCOUNTER
From: Savage Quinones  To: Declan Jayjay APRN - CNM  Sent: 5/6/2021 8:47 AM EDT  Subject: Prescription Question    Hey hope everything is good I was trying to get a refill on my prescription at work I was hoping to get a 90 day on my birth control but they said I don't have enough refills left to make it a 90 is there anyway that you can call me in a prescription for the 90 days so that I can try to get three months at a time? Lo loestrin Fe I only take the blue tablets and skip that last 4 abs start a new pack 28 tabs for 21 day supply . Rachel Brill but for 3 months at a time . [place] [facility] .. just let me know thank you - Marbella Sepulveda

## 2021-06-30 ENCOUNTER — HOSPITAL ENCOUNTER (OUTPATIENT)
Age: 31
Setting detail: SPECIMEN
Discharge: HOME | End: 2021-06-30
Payer: COMMERCIAL

## 2021-06-30 ENCOUNTER — OFFICE VISIT (OUTPATIENT)
Dept: OBGYN CLINIC | Age: 31
End: 2021-06-30
Payer: COMMERCIAL

## 2021-06-30 VITALS
BODY MASS INDEX: 28.48 KG/M2 | DIASTOLIC BLOOD PRESSURE: 89 MMHG | SYSTOLIC BLOOD PRESSURE: 138 MMHG | WEIGHT: 187.9 LBS | HEIGHT: 68 IN | HEART RATE: 85 BPM

## 2021-06-30 DIAGNOSIS — Z20.2 POSSIBLE EXPOSURE TO STD: Primary | ICD-10-CM

## 2021-06-30 PROCEDURE — 99213 OFFICE O/P EST LOW 20 MIN: CPT | Performed by: ADVANCED PRACTICE MIDWIFE

## 2021-07-02 LAB
C. TRACHOMATIS DNA ,URINE: NEGATIVE
N. GONORRHOEAE DNA, URINE: NEGATIVE
SPECIMEN DESCRIPTION: NORMAL

## 2021-07-08 LAB
CHOLESTEROL/HDL RATIO: 4.8
CHOLESTEROL: 169 MG/DL
GLUCOSE BLD-MCNC: 95 MG/DL (ref 70–99)
HDLC SERPL-MCNC: 35 MG/DL
LDL CHOLESTEROL: 94 MG/DL (ref 0–130)
PATIENT FASTING?: YES
TRIGL SERPL-MCNC: 202 MG/DL
VLDLC SERPL CALC-MCNC: ABNORMAL MG/DL (ref 1–30)

## 2021-07-08 NOTE — PROGRESS NOTES
Ronna Shaw (:  1990) is a 27 y.o. female,Established patient, here for evaluation of the following chief complaint(s):  Follow-up (STD testing/ Denied chaperone )         ASSESSMENT/PLAN:  1. Possible exposure to STD      No follow-ups on file. Subjective   SUBJECTIVE/OBJECTIVE:  Christa Jaquez is here today because her partner tested positive for chlamydia again. He was told he may have chronic infection from his prostate. He denies sexual activity with any other partners. She also denies sexual activity with other partners. Christa Jaquez wants to be tested again. She was positive in January, treated , and negative in February. He was positive also and treated. He had symptoms and went in for testing again. Review of Systems   Genitourinary: Negative. Objective   Physical Exam  Genitourinary:     Comments: Urine sent for GC/Chlamydia           An electronic signature was used to authenticate this note.     --TREY Brandon CNM

## 2021-08-16 DIAGNOSIS — K21.9 GASTROESOPHAGEAL REFLUX DISEASE WITHOUT ESOPHAGITIS: ICD-10-CM

## 2021-08-16 DIAGNOSIS — I10 ESSENTIAL HYPERTENSION: Chronic | ICD-10-CM

## 2021-08-17 RX ORDER — HYDROCHLOROTHIAZIDE 12.5 MG/1
TABLET ORAL
Qty: 60 TABLET | Refills: 3 | Status: SHIPPED | OUTPATIENT
Start: 2021-08-17 | End: 2021-09-20 | Stop reason: SDUPTHER

## 2021-09-20 ENCOUNTER — OFFICE VISIT (OUTPATIENT)
Dept: PRIMARY CARE CLINIC | Age: 31
End: 2021-09-20
Payer: COMMERCIAL

## 2021-09-20 VITALS
SYSTOLIC BLOOD PRESSURE: 128 MMHG | HEART RATE: 71 BPM | WEIGHT: 184 LBS | HEIGHT: 68 IN | BODY MASS INDEX: 27.89 KG/M2 | DIASTOLIC BLOOD PRESSURE: 72 MMHG | OXYGEN SATURATION: 97 %

## 2021-09-20 DIAGNOSIS — I10 ESSENTIAL HYPERTENSION: Primary | ICD-10-CM

## 2021-09-20 PROCEDURE — 99213 OFFICE O/P EST LOW 20 MIN: CPT | Performed by: FAMILY MEDICINE

## 2021-09-20 RX ORDER — LABETALOL 200 MG/1
200 TABLET, FILM COATED ORAL DAILY
Qty: 90 TABLET | Refills: 1 | Status: SHIPPED | OUTPATIENT
Start: 2021-09-20 | End: 2021-12-09 | Stop reason: SDUPTHER

## 2021-09-20 RX ORDER — HYDROCHLOROTHIAZIDE 12.5 MG/1
TABLET ORAL
Qty: 90 TABLET | Refills: 1 | Status: SHIPPED | OUTPATIENT
Start: 2021-09-20 | End: 2022-09-01 | Stop reason: SDUPTHER

## 2021-09-20 RX ORDER — M-VIT,TX,IRON,MINS/CALC/FOLIC 27MG-0.4MG
1 TABLET ORAL DAILY
COMMUNITY

## 2021-09-20 NOTE — PROGRESS NOTES
Subjective:      Patient ID: Christoph Clayton is a 27 y.o. female. Is doing well  No side effects of BP med      Review of Systems   Constitutional: Negative. All other systems reviewed and are negative. Objective:   Physical Exam  Vitals reviewed. Constitutional:       Appearance: Normal appearance. Eyes:      Pupils: Pupils are equal, round, and reactive to light. Cardiovascular:      Rate and Rhythm: Normal rate and regular rhythm. Pulmonary:      Effort: Pulmonary effort is normal.      Breath sounds: Normal breath sounds. Musculoskeletal:         General: Normal range of motion. Neurological:      General: No focal deficit present. Mental Status: She is alert. Psychiatric:         Mood and Affect: Mood normal.         Thought Content: Thought content normal.         Assessment:      1. Essential hypertension            Plan:      Faiza Fisher was seen today for hypertension.     Diagnoses and all orders for this visit:    Essential hypertension    refill BP meds  Rt in 6 mo get potassium level then            Electronically signed by Asha Goff MD on 9/20/2021 at 11:22 AM

## 2021-09-29 ENCOUNTER — OFFICE VISIT (OUTPATIENT)
Dept: OBGYN CLINIC | Age: 31
End: 2021-09-29
Payer: COMMERCIAL

## 2021-09-29 ENCOUNTER — HOSPITAL ENCOUNTER (OUTPATIENT)
Age: 31
Setting detail: SPECIMEN
Discharge: HOME | End: 2021-09-29
Payer: COMMERCIAL

## 2021-09-29 VITALS
BODY MASS INDEX: 28.16 KG/M2 | HEIGHT: 68 IN | WEIGHT: 185.8 LBS | HEART RATE: 71 BPM | DIASTOLIC BLOOD PRESSURE: 78 MMHG | SYSTOLIC BLOOD PRESSURE: 121 MMHG

## 2021-09-29 DIAGNOSIS — Z20.2 EXPOSURE TO STD: Primary | ICD-10-CM

## 2021-09-29 DIAGNOSIS — Z20.2 POSSIBLE EXPOSURE TO STD: ICD-10-CM

## 2021-09-29 PROCEDURE — 99212 OFFICE O/P EST SF 10 MIN: CPT | Performed by: ADVANCED PRACTICE MIDWIFE

## 2021-09-29 NOTE — PROGRESS NOTES
Rubina Llamas (:  1990) is a 27 y.o. female,Established patient, here for evaluation of the following chief complaint(s):  Sexually Transmitted Diseases         ASSESSMENT/PLAN:  1. Exposure to STD  -     C.trachomatis N.gonorrhoeae DNA, Urine; Future      No follow-ups on file. Subjective   SUBJECTIVE/OBJECTIVE:  Valerie Mendes is here today to follow up for Chlamydia treatment. Her partner was diagnosed with Chlamudia that had entered the prostate and was reinfecting him. He has been treated and has follow up tomorrow. Review of Systems   Genitourinary: Negative. Objective   /78   Pulse 71   Ht 5' 8\" (1.727 m)   Wt 185 lb 12.8 oz (84.3 kg)   BMI 28.25 kg/m²   Urine sent for GC/Chlam.       On this date 2021 I have spent 15 minutes reviewing previous notes, test results and face to face with the patient discussing the diagnosis and importance of compliance with the treatment plan as well as documenting on the day of the visit. An electronic signature was used to authenticate this note.     --TREY Rodriguez CNM

## 2021-09-30 DIAGNOSIS — Z20.2 EXPOSURE TO STD: ICD-10-CM

## 2021-12-08 DIAGNOSIS — K21.9 GASTROESOPHAGEAL REFLUX DISEASE WITHOUT ESOPHAGITIS: ICD-10-CM

## 2021-12-08 DIAGNOSIS — I10 ESSENTIAL HYPERTENSION: ICD-10-CM

## 2021-12-09 RX ORDER — LABETALOL 200 MG/1
200 TABLET, FILM COATED ORAL DAILY
Qty: 90 TABLET | Refills: 1 | Status: SHIPPED | OUTPATIENT
Start: 2021-12-09 | End: 2022-09-27 | Stop reason: SDUPTHER

## 2022-01-10 ENCOUNTER — TELEPHONE (OUTPATIENT)
Dept: PRIMARY CARE CLINIC | Age: 32
End: 2022-01-10

## 2022-01-10 NOTE — TELEPHONE ENCOUNTER
----- Message from [employer] sent at 1/10/2022  8:40 AM EST -----  Subject: Appointment Request    Reason for Call: Urgent (Patient Request) No Script    QUESTIONS  Type of Appointment? Established Patient  Reason for appointment request? No appointments available during search  Additional Information for Provider? Patient stated she is experiencing an   infection/ingrown toe nail, puss, pain. Patient would like for someone to   call her to schedule appt for 1/11 if possible.   ---------------------------------------------------------------------------  --------------  CALL BACK INFO  What is the best way for the office to contact you? OK to leave message on   [facility]  Preferred Call Back Phone Number? [phone]  ---------------------------------------------------------------------------  --------------  SCRIPT ANSWERS  Relationship to Patient? Self  (Is the patient requesting to see the provider for a procedure?)? No  (Is the patient requesting to see the provider urgently  today or   tomorrow. )? Yes  Have you been diagnosed with, awaiting test results for, or told that you   are suspected of having COVID-19 (Coronavirus)? (If patient has tested   negative or was tested as a requirement for work, school, or travel and   not based on symptoms, answer no)? No  Within the past two weeks have you developed any of the following symptoms   (answer no if symptoms have been present longer than 2 weeks or began   more than 2 weeks ago)? Fever or Chills, Cough, Shortness of breath or   difficulty breathing, Loss of taste or smell, Sore throat, Nasal   congestion, Sneezing or runny nose, Fatigue or generalized body aches   (answer no if pain is specific to a body part e.g. back pain), Diarrhea,   Headache? No  Have you had close contact with someone with COVID-19 in the last 14 days? No  (Service Expert  click yes below to proceed with [employer] As Usual   Scheduling)?  Yes

## 2022-01-10 NOTE — TELEPHONE ENCOUNTER
----- Message from Vincent Penaloza sent at 1/10/2022  8:40 AM EST -----  Subject: Appointment Request    Reason for Call: Urgent (Patient Request) No Script    QUESTIONS  Type of Appointment? Established Patient  Reason for appointment request? No appointments available during search  Additional Information for Provider? Patient stated she is experiencing an   infection/ingrown toe nail, puss, pain. Patient would like for someone to   call her to schedule appt for 1/11 if possible.   ---------------------------------------------------------------------------  --------------  CALL BACK INFO  What is the best way for the office to contact you? OK to leave message on   voicemail  Preferred Call Back Phone Number? [phone]  ---------------------------------------------------------------------------  --------------  SCRIPT ANSWERS  Relationship to Patient? Self  (Is the patient requesting to see the provider for a procedure?)? No  (Is the patient requesting to see the provider urgently  today or   tomorrow. )? Yes  Have you been diagnosed with, awaiting test results for, or told that you   are suspected of having COVID-19 (Coronavirus)? (If patient has tested   negative or was tested as a requirement for work, school, or travel and   not based on symptoms, answer no)? No  Within the past two weeks have you developed any of the following symptoms   (answer no if symptoms have been present longer than 2 weeks or began   more than 2 weeks ago)? Fever or Chills, Cough, Shortness of breath or   difficulty breathing, Loss of taste or smell, Sore throat, Nasal   congestion, Sneezing or runny nose, Fatigue or generalized body aches   (answer no if pain is specific to a body part e.g. back pain), Diarrhea,   Headache? No  Have you had close contact with someone with COVID-19 in the last 14 days? No  (Service Expert  click yes below to proceed with [employer] As Usual   Scheduling)?  Yes

## 2022-01-11 ENCOUNTER — OFFICE VISIT (OUTPATIENT)
Dept: PRIMARY CARE CLINIC | Age: 32
End: 2022-01-11
Payer: COMMERCIAL

## 2022-01-11 VITALS
HEART RATE: 74 BPM | HEIGHT: 68 IN | DIASTOLIC BLOOD PRESSURE: 70 MMHG | BODY MASS INDEX: 27.25 KG/M2 | OXYGEN SATURATION: 97 % | WEIGHT: 179.8 LBS | SYSTOLIC BLOOD PRESSURE: 116 MMHG

## 2022-01-11 DIAGNOSIS — M79.675: ICD-10-CM

## 2022-01-11 DIAGNOSIS — L60.0 INGROWN TOENAIL OF LEFT FOOT WITH INFECTION: ICD-10-CM

## 2022-01-11 DIAGNOSIS — M79.673 PAIN OF FOOT, UNSPECIFIED LATERALITY: Primary | ICD-10-CM

## 2022-01-11 PROCEDURE — 64450 NJX AA&/STRD OTHER PN/BRANCH: CPT | Performed by: FAMILY MEDICINE

## 2022-01-11 PROCEDURE — 11750 EXCISION NAIL&NAIL MATRIX: CPT | Performed by: FAMILY MEDICINE

## 2022-01-11 PROCEDURE — 99213 OFFICE O/P EST LOW 20 MIN: CPT | Performed by: FAMILY MEDICINE

## 2022-01-11 RX ORDER — CEPHALEXIN 500 MG/1
500 CAPSULE ORAL 3 TIMES DAILY
Qty: 21 CAPSULE | Refills: 0 | Status: SHIPPED | OUTPATIENT
Start: 2022-01-11 | End: 2022-01-18

## 2022-01-11 RX ORDER — ACETAMINOPHEN AND CODEINE PHOSPHATE 300; 30 MG/1; MG/1
1 TABLET ORAL EVERY 4 HOURS PRN
Qty: 10 TABLET | Refills: 0 | Status: SHIPPED | OUTPATIENT
Start: 2022-01-11 | End: 2022-01-14

## 2022-01-11 NOTE — LETTER
[address] [facility]  [address] [address]  Phone: [phone]  Fax: [phone]    Jame Mills MD        January 11, 2022     Patient: Ashley Mejia   YOB: 1990   Date of Visit: 1/11/2022       To Whom It May Concern: It is my medical opinion that Ashley Mejia may return to work on 01/13/2022 without restrictions. If you have any questions or concerns, please don't hesitate to call.     Sincerely,        Jame Mills MD

## 2022-01-12 ENCOUNTER — TELEPHONE (OUTPATIENT)
Dept: PRIMARY CARE CLINIC | Age: 32
End: 2022-01-12

## 2022-01-12 NOTE — TELEPHONE ENCOUNTER
Patient called stating her toe was still very sore and swollen. Spoke with nurse and she gave the following recommendations for patient:    Jono Sánchez for swelling  Keep up on pain meds   Ice and elevate    Patient verbalized understanding and requests we add an additional day to her time off for work. Asked it be emailed to [email or web address]. I will print and email work note for her. Advised pt to call if any other needs arise.

## 2022-01-12 NOTE — PROGRESS NOTES
Subjective:     Patient ID: Tony Flores is a 27 y.o. female    HPI  This patient presents today with a painful swollen left great toe and an apparent ingrown toenail with periungual abscess. She has an appointment with a podiatrist in 2 days but does not think she can wait. She came in today because she is concerned about a cellulitis and would like to have that issue addressed. 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 We discussed options of wedge excision of the ingrown toenail and she is agreeable. We discussed potential complications of bleeding and infection as well as pain and she is agreeable      Review of Systems    Noncontributory    Objective:     Physical Exam  Vitals and nursing note reviewed. Constitutional:       Appearance: Normal appearance. HENT:      Head: Normocephalic. Right Ear: External ear normal.      Left Ear: External ear normal.      Nose: Nose normal.   Musculoskeletal:      Comments: Great toe left foot medial aspect periungual abscess secondary to ingrown toenail very tender to touch red with pussy drainage   Neurological:      General: No focal deficit present. Mental Status: She is alert and oriented to person, place, and time. Psychiatric:         Mood and Affect: Mood normal.         Behavior: Behavior normal.     Procedure note: The left toe is scrubbed in the usual fashion with antiseptic. A ring block is done with 1 mL of 2% Xylocaine injected into the webspace of the great toe and also on the medial aspect a rubber band is then applied for hemostasis and to augment the anesthesia. After waiting for successful anesthesia the medial nailbed is explored with a nail elevator we are able to elevate a significant portion of submerged nail the periungual scar tissue was excised with a scalpel after exposure of the submerged nail a large nail clipper was used to longitudinally excise the submerged nail. I took perhaps 30% of her toenail.   I was careful to get back to the matrix and to clean the gutters of any debris. Once hemostasis was obtained a phenol pledget was put into the matrix to achieve a matrixectomy. The local area where the periungual scar tissue was excised was cauterized with silver nitrate. We achieved hemostasis after about 5 minutes. The area was then cleansed and irrigated and packed with antibiotic ointment and a sterile dressing was applied. The rubber band tourniquet was removed. The patient tolerated the procedure very well without complication. Estimated blood loss was minimal.    Assessment/Plan:     1. Pain of foot, unspecified laterality    2. Ingrown toenail of left foot with infection    3. Periungual pain of left foot          Rigo Lucero was seen today for toe pain. Diagnoses and all orders for this visit:    Pain of foot, unspecified laterality  -     acetaminophen-codeine (TYLENOL/CODEINE #3) 300-30 MG per tablet; Take 1 tablet by mouth every 4 hours as needed for Pain for up to 3 days. Intended supply: 3 days. Take lowest dose possible to manage pain    Ingrown toenail of left foot with infection  See procedure note. Recommend that we leave the sterile dressing on the next 36 hours. After which the dressing can be removed and she can start foot soaks in soapy water or Epson salts. She should then dab the toenail dry and apply antibiotic ointment and then cover the wound. She should limit the dependency with the foot and try and elevate it is much as she can. Periungual pain of left foot  Recommend for pain that she takes Tylenol or Motrin first I also ice the foot if it feels better and then use the Tylenol 3 as needed  Other orders  -     cephALEXin (KEFLEX) 500 MG capsule; Take 1 capsule by mouth 3 times daily for 7 days    Return to office in about 10 days  Off for today and tomorrow    Memo Park MD    Please note that this chart was generated using voice recognition Dragon dictation software. Although every effort was made to ensure the accuracy of this automated transcription, some errors in transcription may have occurred.

## 2022-01-13 ENCOUNTER — OFFICE VISIT (OUTPATIENT)
Dept: PRIMARY CARE CLINIC | Age: 32
End: 2022-01-13
Payer: COMMERCIAL

## 2022-01-13 VITALS
BODY MASS INDEX: 27.13 KG/M2 | OXYGEN SATURATION: 98 % | HEART RATE: 84 BPM | WEIGHT: 179 LBS | DIASTOLIC BLOOD PRESSURE: 82 MMHG | HEIGHT: 68 IN | SYSTOLIC BLOOD PRESSURE: 130 MMHG

## 2022-01-13 DIAGNOSIS — L60.0 INGROWN TOENAIL: Primary | ICD-10-CM

## 2022-01-13 PROCEDURE — 99213 OFFICE O/P EST LOW 20 MIN: CPT | Performed by: FAMILY MEDICINE

## 2022-01-13 ASSESSMENT — PATIENT HEALTH QUESTIONNAIRE - PHQ9
SUM OF ALL RESPONSES TO PHQ QUESTIONS 1-9: 0
2. FEELING DOWN, DEPRESSED OR HOPELESS: 0
1. LITTLE INTEREST OR PLEASURE IN DOING THINGS: 0
SUM OF ALL RESPONSES TO PHQ9 QUESTIONS 1 & 2: 0
SUM OF ALL RESPONSES TO PHQ QUESTIONS 1-9: 0

## 2022-01-13 NOTE — PROGRESS NOTES
Subjective:      Patient ID: Leatha Luciano is a 27 y.o. female. Post op  Infected ingrown toenail left great  Soaks  Antibiotics and pain med      Review of Systems   Constitutional: Negative. Skin:        About half nail removed a liilte exudate no pus  Some inflammation at base and small spot of granulation   All other systems reviewed and are negative. Objective:   Physical Exam  Skin:     Comments: Post op toenail         Assessment:      1. Ingrown toenail            Plan:      Jose Angel Tolliver was seen today for toe pain.     Diagnoses and all orders for this visit:    Ingrown toenail      Stay the course  Off work if needed until Monday          Electronically signed by Beverly Esquivel MD on 1/13/2022 at 12:53 PM

## 2022-01-13 NOTE — LETTER
[address] [facility]  [address] [id number]  Phone: [phone]  Fax: [phone]    Arlee Osler, MD        January 13, 2022     Patient: Yamile Quesada   YOB: 1990   Date of Visit: 1/13/2022       To Whom It May Concern: It is my medical opinion that Yamile Quesada be excused from work 1/13/22-1/16/22. She may return to work Monday 1/17/22. If you have any questions or concerns, please don't hesitate to call.     Sincerely,        Arlee Osler, MD

## 2022-01-19 ENCOUNTER — OFFICE VISIT (OUTPATIENT)
Dept: PRIMARY CARE CLINIC | Age: 32
End: 2022-01-19

## 2022-01-19 VITALS
OXYGEN SATURATION: 96 % | DIASTOLIC BLOOD PRESSURE: 72 MMHG | HEART RATE: 73 BPM | BODY MASS INDEX: 27.28 KG/M2 | HEIGHT: 68 IN | WEIGHT: 180 LBS | SYSTOLIC BLOOD PRESSURE: 118 MMHG

## 2022-01-19 DIAGNOSIS — L60.0 INGROWN TOENAIL OF LEFT FOOT WITH INFECTION: Primary | ICD-10-CM

## 2022-01-19 DIAGNOSIS — M79.675: ICD-10-CM

## 2022-01-19 PROCEDURE — 99999 PR OFFICE/OUTPT VISIT,PROCEDURE ONLY: CPT | Performed by: FAMILY MEDICINE

## 2022-01-19 ASSESSMENT — PATIENT HEALTH QUESTIONNAIRE - PHQ9
1. LITTLE INTEREST OR PLEASURE IN DOING THINGS: 0
2. FEELING DOWN, DEPRESSED OR HOPELESS: 0
SUM OF ALL RESPONSES TO PHQ QUESTIONS 1-9: 0
SUM OF ALL RESPONSES TO PHQ9 QUESTIONS 1 & 2: 0
SUM OF ALL RESPONSES TO PHQ QUESTIONS 1-9: 0

## 2022-01-19 NOTE — PROGRESS NOTES
Subjective:     Patient ID: Robert De La O is a 27 y.o. female    HPI  Saji Stafford returns for recheck on her toenail after the excision and matrixectomy about a third of her toenail. She says she still has some nervelike pain but overall she feels much better. She has been soaking it on a regular basis and covering with antibiotic ointment and a Band-Aid. Review of Systems    Noncontributory    Objective:     Physical Exam  Vitals and nursing note reviewed. Constitutional:       Appearance: Normal appearance. Neurological:      Mental Status: She is alert. On examination of the nailbed the gutter looks very clean. Nail elevator was used to explore the wound. All good. Explained to the patient that it may take 6 months for her to grow normal nail. The purpose of the matrixectomy is to keep the nail from growing back too quickly such that he can be dystrophic. She expressed understanding    Assessment/Plan:     1. Ingrown toenail of left foot with infection    2. Periungual pain of left foot          Saji Stafford was seen today for ingrown toenail. Diagnoses and all orders for this visit:    Ingrown toenail of left foot with infection    Periungual pain of left foot    Continue to soak stay antibiotics. Use commonsense in terms of foot care. Any problems please let us know. Tucker Leon MD    Please note that this chart was generated using voice recognition Dragon dictation software. Although every effort was made to ensure the accuracy of this automated transcription, some errors in transcription may have occurred.

## 2022-02-17 ENCOUNTER — OFFICE VISIT (OUTPATIENT)
Dept: PRIMARY CARE CLINIC | Age: 32
End: 2022-02-17
Payer: COMMERCIAL

## 2022-02-17 VITALS
HEART RATE: 77 BPM | BODY MASS INDEX: 28.04 KG/M2 | HEIGHT: 68 IN | WEIGHT: 185 LBS | OXYGEN SATURATION: 98 % | DIASTOLIC BLOOD PRESSURE: 62 MMHG | SYSTOLIC BLOOD PRESSURE: 120 MMHG

## 2022-02-17 DIAGNOSIS — I10 ESSENTIAL HYPERTENSION: ICD-10-CM

## 2022-02-17 DIAGNOSIS — K21.9 GASTROESOPHAGEAL REFLUX DISEASE WITHOUT ESOPHAGITIS: Primary | ICD-10-CM

## 2022-02-17 PROCEDURE — 99213 OFFICE O/P EST LOW 20 MIN: CPT | Performed by: FAMILY MEDICINE

## 2022-02-17 SDOH — ECONOMIC STABILITY: FOOD INSECURITY: WITHIN THE PAST 12 MONTHS, THE FOOD YOU BOUGHT JUST DIDN'T LAST AND YOU DIDN'T HAVE MONEY TO GET MORE.: NEVER TRUE

## 2022-02-17 SDOH — ECONOMIC STABILITY: FOOD INSECURITY: WITHIN THE PAST 12 MONTHS, YOU WORRIED THAT YOUR FOOD WOULD RUN OUT BEFORE YOU GOT MONEY TO BUY MORE.: NEVER TRUE

## 2022-02-17 ASSESSMENT — PATIENT HEALTH QUESTIONNAIRE - PHQ9
SUM OF ALL RESPONSES TO PHQ QUESTIONS 1-9: 0
1. LITTLE INTEREST OR PLEASURE IN DOING THINGS: 0
2. FEELING DOWN, DEPRESSED OR HOPELESS: 0
SUM OF ALL RESPONSES TO PHQ QUESTIONS 1-9: 0
SUM OF ALL RESPONSES TO PHQ9 QUESTIONS 1 & 2: 0

## 2022-02-17 ASSESSMENT — SOCIAL DETERMINANTS OF HEALTH (SDOH): HOW HARD IS IT FOR YOU TO PAY FOR THE VERY BASICS LIKE FOOD, HOUSING, MEDICAL CARE, AND HEATING?: NOT HARD AT ALL

## 2022-02-17 NOTE — PROGRESS NOTES
Subjective:      Patient ID: Pepe Lay is a 27 y.o. female. Recheck toenail left  Routine visit for BP and Gerd meds  No issues      Review of Systems   Constitutional: Negative. Eyes: Negative. Respiratory: Negative. Cardiovascular: Negative. Gastrointestinal: Negative. Endocrine: Negative. Genitourinary: Negative. Musculoskeletal: Negative. Allergic/Immunologic: Negative. Neurological: Negative. Hematological: Negative. Psychiatric/Behavioral: Negative. All other systems reviewed and are negative. Objective:   Physical Exam  Vitals reviewed. Constitutional:       Appearance: Normal appearance. HENT:      Head: Normocephalic. Nose: Nose normal.   Eyes:      Pupils: Pupils are equal, round, and reactive to light. Cardiovascular:      Rate and Rhythm: Normal rate. Pulmonary:      Effort: Pulmonary effort is normal.   Musculoskeletal:         General: Normal range of motion. Cervical back: Normal range of motion. Skin:     Comments: Toe looks good  New nail growing and no infection or redness   Neurological:      General: No focal deficit present. Mental Status: She is alert. Psychiatric:         Mood and Affect: Mood normal.         Thought Content: Thought content normal.         Assessment:      1. Gastroesophageal reflux disease without esophagitis    2. Essential hypertension            Plan:      Kennedy Dyer was seen today for hypertension and gastroesophageal reflux.     Diagnoses and all orders for this visit:    Gastroesophageal reflux disease without esophagitis    Essential hypertension                Electronically signed by Emiliano Bowman MD on 2/17/2022 at 1:29 PM

## 2022-04-30 DIAGNOSIS — K21.9 GASTROESOPHAGEAL REFLUX DISEASE WITHOUT ESOPHAGITIS: ICD-10-CM

## 2022-05-02 ENCOUNTER — PATIENT MESSAGE (OUTPATIENT)
Dept: PRIMARY CARE CLINIC | Age: 32
End: 2022-05-02

## 2022-05-02 NOTE — TELEPHONE ENCOUNTER
From: Jose Richford  To: Dr. Cathy Dixon: 5/2/2022 5:57 AM EDT  Subject: Adderall    I was just wondering if it would possible to be evaluated or considered for Adderall as an adult I feel sometimes I struggle with daily job duties and cant focus to complete one job before Im starting the next. I just wondered how the process worked?

## 2022-05-29 ENCOUNTER — PATIENT MESSAGE (OUTPATIENT)
Dept: PRIMARY CARE CLINIC | Age: 32
End: 2022-05-29

## 2022-05-31 DIAGNOSIS — Z30.41 ENCOUNTER FOR SURVEILLANCE OF CONTRACEPTIVE PILLS: ICD-10-CM

## 2022-05-31 NOTE — TELEPHONE ENCOUNTER
Pt scheduled annual for 8/17/22 with Kane Wagner. She is requesting it be filled today because she is out of medication.

## 2022-05-31 NOTE — TELEPHONE ENCOUNTER
From: Jes Tyson  To: Dr. Angelina Resendiz: 5/29/2022 6:31 PM EDT  Subject: Antibiotics     I trimmed My toenails and I fear I may have trimmed them too low again the big toe on my right foot is sore and red is there any way i can get antibiotics to help prevent and infection/ingrown nail again ? Im in a wedding next week and dont wanna be in a bad spot with my toes.

## 2022-05-31 NOTE — TELEPHONE ENCOUNTER
Wydelmy Sheylakelly is requesting a refill on 5/31/22. Last Annual visit:  2/10/21, sent msg.  For patient to schedule annual.  Next Office visit:  None on schedule    Currently Pregnant no  Last OB visit: n/a  Next OB visit: n/a    Last prescribing provider:  Carla Mora

## 2022-06-01 ENCOUNTER — OFFICE VISIT (OUTPATIENT)
Dept: PRIMARY CARE CLINIC | Age: 32
End: 2022-06-01
Payer: COMMERCIAL

## 2022-06-01 VITALS
OXYGEN SATURATION: 98 % | WEIGHT: 183 LBS | SYSTOLIC BLOOD PRESSURE: 112 MMHG | HEART RATE: 67 BPM | DIASTOLIC BLOOD PRESSURE: 70 MMHG | BODY MASS INDEX: 27.74 KG/M2 | HEIGHT: 68 IN

## 2022-06-01 DIAGNOSIS — L60.0 INGROWN RIGHT BIG TOENAIL: Primary | ICD-10-CM

## 2022-06-01 PROCEDURE — 99213 OFFICE O/P EST LOW 20 MIN: CPT | Performed by: NURSE PRACTITIONER

## 2022-06-01 ASSESSMENT — PATIENT HEALTH QUESTIONNAIRE - PHQ9
1. LITTLE INTEREST OR PLEASURE IN DOING THINGS: 0
SUM OF ALL RESPONSES TO PHQ9 QUESTIONS 1 & 2: 0
SUM OF ALL RESPONSES TO PHQ QUESTIONS 1-9: 0
SUM OF ALL RESPONSES TO PHQ QUESTIONS 1-9: 0
2. FEELING DOWN, DEPRESSED OR HOPELESS: 0
SUM OF ALL RESPONSES TO PHQ QUESTIONS 1-9: 0
SUM OF ALL RESPONSES TO PHQ QUESTIONS 1-9: 0

## 2022-06-01 NOTE — PROGRESS NOTES
[address] WALK-IN  [address] [facility],  [address]  [address] [address]  Dept: [phone]    Veena Kaur is a 34 y.o. female Established patient, who presents to the walk-in clinic today with conditions/complaints as noted below:    Chief Complaint   Patient presents with    Ingrown Toenail     one nail excised in January by Dr. Vickey Park, right big toe, recently trimmed toenail and has been soaking in salt water          HPI:     Patient presents to walk in clinic with concerns about ingrown toenail to right great toe. Started over the weekend. Area red, swollen, painful with purulent drainage. She does not have any Epsom salt so she has been soaking her foot in hot water mixed with table salt and promise dish soap. Today, toe is less red and no longer swollen. No drainage today. She had to have ingrown toenail surgically corrected earlier this year. She is in a wedding next week and wants her foot better. Past Medical History:   Diagnosis Date    Dislocation, hip (HCC)     Murmur     Vasovagal near syncope        Current Outpatient Medications   Medication Sig Dispense Refill    norethindrone-ethinyl estradiol-Fe (LO LOESTRIN FE) 1 MG-10 MCG / 10 MCG tablet Take 1 tablet by mouth daily Take 1 tab by mouth daily, skip placebo week and start new pack for continuous cycle 28 tablet 2    esomeprazole (NEXIUM) 20 MG delayed release capsule TAKE 1 CAPSULE BY MOUTH ONCE DAILY. 30 capsule 3    labetalol (NORMODYNE) 200 MG tablet Take 1 tablet by mouth daily 90 tablet 1    Multiple Vitamins-Minerals (THERAPEUTIC MULTIVITAMIN-MINERALS) tablet Take 1 tablet by mouth daily      hydroCHLOROthiazide (HYDRODIURIL) 12.5 MG tablet TAKE 1 TABLET (12.5MG) BY MOUTH ONCE DAILY.  90 tablet 1    Omega-3 Fatty Acids (FISH OIL) 1000 MG CAPS Take 1 capsule by mouth 2 times daily 60 capsule 3    Cetirizine HCl (ZYRTEC PO) Take by mouth      Probiotic Product (PROBIOTIC PO) Take by mouth      ibuprofen (ADVIL;MOTRIN) 800 MG tablet Take 800 mg by mouth every 6 hours as needed for Pain       No current facility-administered medications for this visit. No Known Allergies    :     Review of Systems   Constitutional: Negative for appetite change and fever. Skin: Positive for wound (ingrown toenail right great toe). Neurological: Negative for weakness and numbness. Psychiatric/Behavioral: Negative for sleep disturbance.       :     /70   Pulse 67   Ht 5' 8\" (1.727 m)   Wt 183 lb (83 kg)   SpO2 98%   BMI 27.83 kg/m²     Physical Exam  Vitals and nursing note reviewed. Cardiovascular:      Rate and Rhythm: Normal rate. Pulses:           Dorsalis pedis pulses are 2+ on the right side. Posterior tibial pulses are 2+ on the right side. Pulmonary:      Effort: Pulmonary effort is normal. No respiratory distress. Musculoskeletal:        Feet:    Feet:      Right foot:      Toenail Condition: Right toenails are ingrown. Skin:     General: Skin is warm and dry. Neurological:      Mental Status: She is oriented to person, place, and time. Psychiatric:         Mood and Affect: Mood normal.         Behavior: Behavior normal.           :          1. Ingrown right big toenail       No infection noted today. Area seems to be healing. Continue warm water soaks 3-4 times per day. Continue to lift corner of nail. Recommend she wear sandals to allow to heal.   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   Getting new nail clipper today to ensure it is sharp. Call office with concerns.     :      Return if symptoms worsen or fail to improve. No orders of the defined types were placed in this encounter. Patient and/or parent given educational materials - see patient instructions. Discussed use, benefit, and side effects of prescribed medications. All patient questions answered.   Patient and/or parent voiced understanding.       Electronically signed by Burleigh Sacks, APRN - CNPon 6/1/2022 at 11:58 AM

## 2022-06-14 LAB
CHOLESTEROL/HDL RATIO: 4.9
CHOLESTEROL: 191 MG/DL
GLUCOSE BLD-MCNC: 103 MG/DL (ref 70–99)
HDLC SERPL-MCNC: 39 MG/DL
LDL CHOLESTEROL: 118 MG/DL (ref 0–130)
PATIENT FASTING?: YES
TRIGL SERPL-MCNC: 169 MG/DL

## 2022-06-27 ENCOUNTER — TELEPHONE (OUTPATIENT)
Dept: OBGYN CLINIC | Age: 32
End: 2022-06-27

## 2022-06-27 NOTE — TELEPHONE ENCOUNTER
Called and spoke with pt and she knew her insurance wasn't going to cover loloestrin so she used a discount card for it and has it.

## 2022-07-27 DIAGNOSIS — Z30.41 ENCOUNTER FOR SURVEILLANCE OF CONTRACEPTIVE PILLS: ICD-10-CM

## 2022-07-27 RX ORDER — NORETHINDRONE ACETATE AND ETHINYL ESTRADIOL, ETHINYL ESTRADIOL AND FERROUS FUMARATE 1MG-10(24)
KIT ORAL
Qty: 28 TABLET | Refills: 0 | Status: SHIPPED | OUTPATIENT
Start: 2022-07-27 | End: 2022-08-17

## 2022-07-27 NOTE — TELEPHONE ENCOUNTER
Юлия Guaman is requesting a refill on LO LOESTRIN FE 1-10 TABLET.      Last Annual visit:  02/10/21  Next Office visit:  08/17/22    Currently Pregnant no  Last OB visit: n/a  Next OB visit: n/a    Last prescribing provider:  Mony Brannon

## 2022-08-17 ENCOUNTER — HOSPITAL ENCOUNTER (OUTPATIENT)
Age: 32
Setting detail: SPECIMEN
Discharge: HOME | End: 2022-08-17

## 2022-08-17 ENCOUNTER — OFFICE VISIT (OUTPATIENT)
Dept: OBGYN CLINIC | Age: 32
End: 2022-08-17
Payer: COMMERCIAL

## 2022-08-17 ENCOUNTER — OFFICE VISIT (OUTPATIENT)
Dept: PRIMARY CARE CLINIC | Age: 32
End: 2022-08-17
Payer: COMMERCIAL

## 2022-08-17 VITALS
HEART RATE: 88 BPM | DIASTOLIC BLOOD PRESSURE: 78 MMHG | WEIGHT: 186.8 LBS | SYSTOLIC BLOOD PRESSURE: 126 MMHG | BODY MASS INDEX: 28.4 KG/M2

## 2022-08-17 VITALS
HEIGHT: 68 IN | WEIGHT: 186.4 LBS | OXYGEN SATURATION: 97 % | SYSTOLIC BLOOD PRESSURE: 126 MMHG | BODY MASS INDEX: 28.25 KG/M2 | DIASTOLIC BLOOD PRESSURE: 74 MMHG | HEART RATE: 76 BPM

## 2022-08-17 DIAGNOSIS — K21.9 GASTROESOPHAGEAL REFLUX DISEASE WITHOUT ESOPHAGITIS: Chronic | ICD-10-CM

## 2022-08-17 DIAGNOSIS — I10 ESSENTIAL HYPERTENSION: Primary | Chronic | ICD-10-CM

## 2022-08-17 DIAGNOSIS — R73.01 ELEVATED FASTING BLOOD SUGAR: ICD-10-CM

## 2022-08-17 DIAGNOSIS — Z20.2 POSSIBLE EXPOSURE TO STD: Primary | ICD-10-CM

## 2022-08-17 DIAGNOSIS — F90.2 ATTENTION DEFICIT HYPERACTIVITY DISORDER (ADHD), COMBINED TYPE: ICD-10-CM

## 2022-08-17 DIAGNOSIS — Z30.41 ENCOUNTER FOR SURVEILLANCE OF CONTRACEPTIVE PILLS: ICD-10-CM

## 2022-08-17 DIAGNOSIS — Z01.419 WOMEN'S ANNUAL ROUTINE GYNECOLOGICAL EXAMINATION: ICD-10-CM

## 2022-08-17 LAB — HBA1C MFR BLD: 5.2 %

## 2022-08-17 PROCEDURE — 99395 PREV VISIT EST AGE 18-39: CPT | Performed by: ADVANCED PRACTICE MIDWIFE

## 2022-08-17 PROCEDURE — 99214 OFFICE O/P EST MOD 30 MIN: CPT | Performed by: FAMILY MEDICINE

## 2022-08-17 PROCEDURE — 83036 HEMOGLOBIN GLYCOSYLATED A1C: CPT | Performed by: FAMILY MEDICINE

## 2022-08-17 RX ORDER — DEXTROAMPHETAMINE SACCHARATE, AMPHETAMINE ASPARTATE MONOHYDRATE, DEXTROAMPHETAMINE SULFATE AND AMPHETAMINE SULFATE 1.25; 1.25; 1.25; 1.25 MG/1; MG/1; MG/1; MG/1
5 CAPSULE, EXTENDED RELEASE ORAL DAILY
Qty: 30 CAPSULE | Refills: 0 | Status: SHIPPED | OUTPATIENT
Start: 2022-08-17 | End: 2022-08-17

## 2022-08-17 RX ORDER — NORETHINDRONE ACETATE AND ETHINYL ESTRADIOL, ETHINYL ESTRADIOL AND FERROUS FUMARATE 1MG-10(24)
1 KIT ORAL DAILY
Qty: 28 TABLET | Refills: 11 | Status: SHIPPED | OUTPATIENT
Start: 2022-08-17

## 2022-08-17 ASSESSMENT — PATIENT HEALTH QUESTIONNAIRE - PHQ9
1. LITTLE INTEREST OR PLEASURE IN DOING THINGS: 0
SUM OF ALL RESPONSES TO PHQ QUESTIONS 1-9: 0
2. FEELING DOWN, DEPRESSED OR HOPELESS: 0
2. FEELING DOWN, DEPRESSED OR HOPELESS: 0
SUM OF ALL RESPONSES TO PHQ QUESTIONS 1-9: 0
SUM OF ALL RESPONSES TO PHQ9 QUESTIONS 1 & 2: 0
SUM OF ALL RESPONSES TO PHQ QUESTIONS 1-9: 0
1. LITTLE INTEREST OR PLEASURE IN DOING THINGS: 0
SUM OF ALL RESPONSES TO PHQ9 QUESTIONS 1 & 2: 0
SUM OF ALL RESPONSES TO PHQ QUESTIONS 1-9: 0

## 2022-08-17 ASSESSMENT — ENCOUNTER SYMPTOMS
EYES NEGATIVE: 1
SORE THROAT: 0
ABDOMINAL PAIN: 0
ALLERGIC/IMMUNOLOGIC NEGATIVE: 1
SHORTNESS OF BREATH: 0
RESPIRATORY NEGATIVE: 1
WHEEZING: 0
GASTROINTESTINAL NEGATIVE: 1

## 2022-08-17 ASSESSMENT — ANXIETY QUESTIONNAIRES
5. BEING SO RESTLESS THAT IT IS HARD TO SIT STILL: 0
4. TROUBLE RELAXING: 0
6. BECOMING EASILY ANNOYED OR IRRITABLE: 0
2. NOT BEING ABLE TO STOP OR CONTROL WORRYING: 0
3. WORRYING TOO MUCH ABOUT DIFFERENT THINGS: 0
7. FEELING AFRAID AS IF SOMETHING AWFUL MIGHT HAPPEN: 0

## 2022-08-17 NOTE — PROGRESS NOTES
Subjective:     Patient ID: Mendy Chua is a 34 y.o. female    HPI  Patient returns today for follow-up on her hypertension her gastroesophageal reflux. Her hypertension has been under good control with labetalol and hydrochlorothiazide. She feels well on these medicines  She does well with her GERD as long as she takes her Nexium 20 mg daily. Recommend that she take a drug holiday on the weekends. A new finding that she presents with today is that she is having some trouble with her focus which is associated with some hyperactivity. 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 But she gets bored and she starts surfing on her phone between [employer] and other social media. She sleeps well but does have some difficulties at times with her memory as well as focusing. She also notices that she can get irritable and impatient at times. ADHD screen is done in the office today and included in this record and she scored a 9 in the last 2 columns. She would like to try a low-dose of stimulant and I agree. She is aware of potential side effects    Review of Systems   Constitutional:  Negative for fever. HENT:  Negative for congestion, ear pain and sore throat. Respiratory:  Negative for shortness of breath and wheezing. Cardiovascular:  Negative for chest pain and leg swelling. Gastrointestinal:  Negative for abdominal pain. Genitourinary:  Negative for dysuria. Skin:  Negative for rash. Neurological:  Negative for syncope. Hematological:  Negative for adenopathy. Psychiatric/Behavioral:  Positive for decreased concentration. Negative for sleep disturbance. The patient is hyperactive. The patient is not nervous/anxious. Objective:     Physical Exam  Vitals and nursing note reviewed. Constitutional:       General: She is not in acute distress. Appearance: Normal appearance. HENT:      Head: Normocephalic.       Right Ear: External ear in 1 month    Abhay Rivera MD    Please note that this chart was generated using voice recognition Dragon dictation software. Although every effort was made to ensure the accuracy of this automated transcription, some errors in transcription may have occurred.

## 2022-08-17 NOTE — PROGRESS NOTES
[address] [facility] AND GYNECOLOGY  [facility] [address] [place] [address] [facility] [address]  Dept: [phone]    Patient Name: José Miguel Astudillo  Patient Age: 34 y.o. Date of Visit: 8/17/2022    Subjective  Chief Complaint   Patient presents with    Annual Exam     No LMP recorded. (Menstrual status: Other - See Notes). Chaperone for Intimate Exam  Chaperone was offered as part of the rooming process. Patient declined and agrees to continue with exam without a chaperone. HPI  Patient arrives for annual exam.  Patient denies concerns today. Concerns include wants Pap. Patient reports is  sexually active with 1 male partner(s). Patient denies  pain with sex . Patient has a history of sexually transmitted infection(s). Patient does want screening for sexually transmitted infection(s). Partner had chronic Chlamydia and was treated twice. Has been negative since. Reports is  on contraception OCP    PHQ Scores 8/17/2022 8/17/2022 6/1/2022 2/17/2022 1/19/2022 1/13/2022 2/10/2021   PHQ2 Score 0 0 0 0 0 0 0   PHQ9 Score 0 0 0 0 0 0 0     Interpretation of Total Score Depression Severity: 1-4 = Minimal depression, 5-9 = Mild depression, 10-14 = Moderate depression, 15-19 = Moderately severe depression, 20-27 = Severe depression  RICHARD 7 SCORE 2/10/2021   RICHARD-7 Total Score 2     Interpretation of RICHARD-7 score: 5-9 = mild anxiety, 10-14 = moderate anxiety, 15+ = severe anxiety. Recommend referral to behavioral health for scores 10 or greater. Intimate Partner Violence: Not on file       Review of Systems   Constitutional: Negative. HENT: Negative. Eyes: Negative. Respiratory: Negative. Cardiovascular: Negative. Gastrointestinal: Negative. Endocrine: Negative. Genitourinary: Negative. Musculoskeletal: Negative. Skin: Negative. Allergic/Immunologic: Negative. Neurological: Negative. Hematological: Negative. Behavior: Behavior normal.         Thought Content: Thought content normal.         Judgment: Judgment normal.        Assessment & Plan  1. Possible exposure to STD    - Chlamydia/GC DNA, Thin Prep; Future    2. Women's annual routine gynecological examination    - PAP Smear; Future    3. Encounter for surveillance of contraceptive pills    - norethindrone-ethinyl estradiol-Fe (LO LOESTRIN FE) 1 MG-10 MCG / 10 MCG tablet; Take 1 tablet by mouth daily  Dispense: 28 tablet; Refill: 11      No follow-ups on file. The patient, Patrick Fall , was seen with a total time spent of 25 minutes for the visit on this date of service by the [facility]  The time component, involved both face-to-face (counseling and education)  and non face-to-face time (care coordination), spent in determining the total time component. She was also counseled on her preventative health maintenance recommendations and follow-up.     Electronically Signed by TREY Obregon CNM

## 2022-08-17 NOTE — PROGRESS NOTES
Patient is here for her annual exam  Last pap was 02/10/21  Last len was n/a  Last Dexa scan was n/a  Patient would like STD screening off her pap and needs refills on her bc    Chaperone for Intimate Exam  Chaperone was offered as part of the rooming process. Patient declined and agrees to continue with exam without a chaperone.   Chaperone: n/a       GAD7-0  PHQ2-0

## 2022-08-18 DIAGNOSIS — Z20.2 POSSIBLE EXPOSURE TO STD: ICD-10-CM

## 2022-08-24 LAB — CYTOLOGY REPORT: NORMAL

## 2022-09-01 DIAGNOSIS — I10 ESSENTIAL HYPERTENSION: ICD-10-CM

## 2022-09-01 DIAGNOSIS — K21.9 GASTROESOPHAGEAL REFLUX DISEASE WITHOUT ESOPHAGITIS: ICD-10-CM

## 2022-09-01 RX ORDER — HYDROCHLOROTHIAZIDE 12.5 MG/1
TABLET ORAL
Qty: 90 TABLET | Refills: 1 | Status: SHIPPED | OUTPATIENT
Start: 2022-09-01

## 2022-09-15 ENCOUNTER — OFFICE VISIT (OUTPATIENT)
Dept: PRIMARY CARE CLINIC | Age: 32
End: 2022-09-15
Payer: COMMERCIAL

## 2022-09-15 VITALS
SYSTOLIC BLOOD PRESSURE: 122 MMHG | DIASTOLIC BLOOD PRESSURE: 82 MMHG | HEIGHT: 68 IN | WEIGHT: 181.4 LBS | OXYGEN SATURATION: 97 % | BODY MASS INDEX: 27.49 KG/M2 | HEART RATE: 74 BPM

## 2022-09-15 DIAGNOSIS — M25.531 RIGHT WRIST PAIN: ICD-10-CM

## 2022-09-15 DIAGNOSIS — F90.2 ATTENTION DEFICIT HYPERACTIVITY DISORDER (ADHD), COMBINED TYPE: Primary | ICD-10-CM

## 2022-09-15 PROCEDURE — 99214 OFFICE O/P EST MOD 30 MIN: CPT | Performed by: FAMILY MEDICINE

## 2022-09-15 RX ORDER — DEXTROAMPHETAMINE SACCHARATE, AMPHETAMINE ASPARTATE MONOHYDRATE, DEXTROAMPHETAMINE SULFATE AND AMPHETAMINE SULFATE 1.25; 1.25; 1.25; 1.25 MG/1; MG/1; MG/1; MG/1
5 CAPSULE, EXTENDED RELEASE ORAL DAILY
Qty: 30 CAPSULE | Refills: 0 | Status: SHIPPED | OUTPATIENT
Start: 2022-11-18 | End: 2022-12-18

## 2022-09-15 RX ORDER — DEXTROAMPHETAMINE SACCHARATE, AMPHETAMINE ASPARTATE MONOHYDRATE, DEXTROAMPHETAMINE SULFATE AND AMPHETAMINE SULFATE 1.25; 1.25; 1.25; 1.25 MG/1; MG/1; MG/1; MG/1
5 CAPSULE, EXTENDED RELEASE ORAL DAILY
Qty: 30 CAPSULE | Refills: 0 | Status: SHIPPED | OUTPATIENT
Start: 2022-09-19 | End: 2022-10-19

## 2022-09-15 RX ORDER — DEXTROAMPHETAMINE SACCHARATE, AMPHETAMINE ASPARTATE MONOHYDRATE, DEXTROAMPHETAMINE SULFATE AND AMPHETAMINE SULFATE 1.25; 1.25; 1.25; 1.25 MG/1; MG/1; MG/1; MG/1
CAPSULE, EXTENDED RELEASE ORAL
COMMUNITY
Start: 2022-08-17

## 2022-09-15 RX ORDER — DEXTROAMPHETAMINE SACCHARATE, AMPHETAMINE ASPARTATE MONOHYDRATE, DEXTROAMPHETAMINE SULFATE AND AMPHETAMINE SULFATE 1.25; 1.25; 1.25; 1.25 MG/1; MG/1; MG/1; MG/1
5 CAPSULE, EXTENDED RELEASE ORAL DAILY
Qty: 30 CAPSULE | Refills: 0 | Status: SHIPPED | OUTPATIENT
Start: 2022-10-19 | End: 2022-11-18

## 2022-09-15 ASSESSMENT — PATIENT HEALTH QUESTIONNAIRE - PHQ9
SUM OF ALL RESPONSES TO PHQ9 QUESTIONS 1 & 2: 0
SUM OF ALL RESPONSES TO PHQ QUESTIONS 1-9: 0
1. LITTLE INTEREST OR PLEASURE IN DOING THINGS: 0
SUM OF ALL RESPONSES TO PHQ QUESTIONS 1-9: 0
2. FEELING DOWN, DEPRESSED OR HOPELESS: 0

## 2022-09-15 NOTE — PROGRESS NOTES
Subjective:     Patient ID: Kiran Miller is a 34 y.o. female    HPI  Patient returns today for follow-up on her ADHD treatment. We started Adderall 5 mg at last visit. She notices her coworkers are noting her improvement in ability to focus. Denies any side effects such as chest pain palpitation increased nervousness etc.  Contusional injury R wrist dorsum  Review of Systems   Constitutional:  Negative for fever. HENT:  Negative for congestion, ear pain and sore throat. Respiratory:  Negative for shortness of breath and wheezing. Cardiovascular:  Negative for chest pain and leg swelling. Gastrointestinal:  Negative for abdominal pain. Genitourinary:  Negative for dysuria. Skin:  Negative for rash. Neurological:  Negative for syncope. Hematological:  Negative for adenopathy. Psychiatric/Behavioral:  Positive for decreased concentration. Negative for sleep disturbance. The patient is hyperactive. The patient is not nervous/anxious. Objective:     Physical Exam  Vitals and nursing note reviewed. Constitutional:       General: She is not in acute distress. Appearance: Normal appearance. HENT:      Head: Normocephalic. Right Ear: External ear normal.      Left Ear: External ear normal.      Nose: Nose normal.      Mouth/Throat:      Mouth: Mucous membranes are moist.   Eyes:      Conjunctiva/sclera: Conjunctivae normal.   Cardiovascular:      Rate and Rhythm: Normal rate and regular rhythm. Heart sounds: Normal heart sounds. Pulmonary:      Effort: Pulmonary effort is normal.      Breath sounds: Normal breath sounds. Musculoskeletal:      Cervical back: Normal range of motion. Right lower leg: No edema. Left lower leg: No edema. Comments: She does have some tenderness on the dorsum of her right wrist.  It is not in the area of the scaphoid. .  Most probably contusional.  Recommend ice simple   Skin:     General: Skin is warm and dry.    Neurological: General: No focal deficit present. Mental Status: She is alert and oriented to person, place, and time. Psychiatric:         Mood and Affect: Mood normal.         Behavior: Behavior normal.     Assessment/Plan:     1. Attention deficit hyperactivity disorder (ADHD), combined type    2. Right wrist pain          Nicolás Patel was seen today for adhd and skin problem. Diagnoses and all orders for this visit:    Attention deficit hyperactivity disorder (ADHD), combined type  -     amphetamine-dextroamphetamine (ADDERALL XR) 5 MG extended release capsule; Take 1 capsule by mouth daily for 30 days. -     amphetamine-dextroamphetamine (ADDERALL XR) 5 MG extended release capsule; Take 1 capsule by mouth daily for 30 days. -     amphetamine-dextroamphetamine (ADDERALL XR) 5 MG extended release capsule; Take 1 capsule by mouth daily for 30 days. Right wrist pain    Recommend ice support Tylenol or Advil if no better call for further evaluation      Patient was made aware that controlled substance medicine should only be prescribed 1 month at a time for 3 months. Patient needs to be seen for an appointment in the office every 3 months for proper monitoring of potential side effects. Patient is also aware that medication agreement needs to be updated once a year and that they may be subject to random drug testing for compliance. Patient verbalized understanding and agreement. Jayesh Cho MD    Please note that this chart was generated using voice recognition Dragon dictation software. Although every effort was made to ensure the accuracy of this automated transcription, some errors in transcription may have occurred.

## 2022-09-15 NOTE — LETTER
CONTROLLED SUBSTANCE MEDICATION AGREEMENT     Patient Name: Eugene Yeung  Patient YOB: 1990   I understand, that controlled substance medications may be used to help better manage my symptoms and to improve my ability to function at home, work and in social settings. However, I also understand that these medications do have risks, which have been discussed with me, including possible development of physical or psychological dependence. I understand that successful treatment requires mutual trust and honesty between me and my provider. I understand and agree that following this Medication Agreement is necessary in continuing my provider-patient relationship and the success of my treatment plan. Explanation from my Provider: Benefits and Goals of Controlled Substance Medications: There are two potential goals for your treatment: (1) decreased pain and suffering (2) improved daily life functions. There are many possible treatments for your chronic condition(s). Alternatives such as physical therapy, yoga, massage, home daily exercise, meditation, relaxation techniques, injections, chiropractic manipulations, surgery, cognitive therapy, hypnosis and many medications that are not habit-forming may be used. Use of controlled substance medications may be helpful, but they are unlikely to resolve all symptoms or restore all function. Explanation from my Provider: Risks of Controlled Substance Medications:  Opioid pain medications: These medications can lead to problems such as addiction/dependence, sedation, lightheadedness/dizziness, memory issues, falls, constipation, nausea, or vomiting. They may also impair the ability to drive or operate machinery. Additionally, these medications may lower testosterone levels, leading to loss of bone strength, stamina and sex drive.   They may cause problems with breathing, sleep apnea and reduced coughing, which is especially dangerous for patients with lung disease. Overdose or dangerous interactions with alcohol and other medications may occur, leading to death. Hyperalgesia may develop, which means patients receiving opioids for the treatment of pain may become more sensitive to certain painful stimuli, and in some cases, experience pain from ordinarily non-painful stimuli. Women between the ages of 14-53 who could become pregnant should carefully weigh the risks and benefits of opioids with their physicians, as these medications increase the risk of pregnancy complications, including miscarriage,  delivery and stillbirth. It is also possible for babies to be born addicted to opioids. Opioid dependence withdrawal symptoms may include; feelings of uneasiness, increased pain, irritability, belly pain, diarrhea, sweats and goose-flesh. Benzodiazepines and non-benzodiazepine sleep medications: These medications can lead to problems such as addiction/dependence, sedation, fatigue, lightheadedness, dizziness, incoordination, falls, depression, hallucinations, and impaired judgment, memory and concentration. The ability to drive and operate machinery may also be affected. Abnormal sleep-related behaviors have been reported, including sleepwalking, driving, making telephone calls, eating, or having sex while not fully awake. These medications can suppress breathing and worsen sleep apnea, particularly when combined with alcohol or other sedating medications, potentially leading to death. Dependence withdrawal symptoms may include tremors, anxiety, hallucinations and seizures. Stimulants:  Common adverse effects include addiction/dependence, increased blood  pressure and heart rate, decreased appetite, nausea, involuntary weight loss, insomnia,                                                                                                                     Initials:_______   irritability, and headaches.   These risks may increase when these medications are combined with other stimulants, such as caffeine pills or energy drinks, certain weight loss supplements and oral decongestants. Dependence withdrawal symptoms may include depressed mood, loss of interest, suicidal thoughts, anxiety, fatigue, appetite changes and agitation. Testosterone replacement therapy:  Potential side effects include increased risk of stroke and heart attack, blood clots, increased blood pressure, increased cholesterol, enlarged prostate, sleep apnea, irritability/aggression and other mood disorders, and decreased fertility. I agree and understand that I and my prescriber have the following rights and responsibilities regarding my treatment plan:     1. MY RIGHTS:  To be informed of my treatment and medication plan. To be an active participant in my health and wellbeing. 2. MY RESPONSIBILITY AND UNDERSTANDING FOR USE OF MEDICATIONS   I will take medications at the dose and frequency as directed. For my safety, I will not increase or change how I take my medications without the recommendation of my healthcare provider.  I will actively participate in any program recommended by my provider which may improve function, including social, physical, psychological programs.  I will not take my medications with alcohol or other drugs not prescribed to me. I understand that drinking alcohol with my medications increases the chances of side effects, including reduced breathing rate and could lead to personal injury when operating machinery.  I understand that if I have a history of substance use disorders, including alcohol or other illicit drugs, that I may be at increased risk of addiction to my medications.  I agree to notify my provider immediately if I should become pregnant so that my treatment plan can be adjusted.    I agree and understand that I shall only receive controlled substance medications from the prescriber that signed this agreement unless there is written agreement among other prescribers of controlled substances outlining the responsibility of the medications being prescribed.  I understand that the if the controlled medication is not helping to achieve goals, the dosage may be tapered and no longer prescribed. 3. MY RESPONSIBILITY FOR COMMUNICATION / PRESCRIPTION RENEWALS   I agree that all controlled substance medications that I take will be prescribed only by my provider. If another healthcare provider prescribes me medication in an emergency, I will notify my provider within seventy-two (72) hours.  I will arrange for refills at the prescribed interval ONLY during regular office hours. I will not ask for refills earlier than agreed, after-hours, on holidays or weekends. Refills may take up to 72 hours for processing and prescriptions to reach the pharmacy.  I will inform my other health care providers that I am taking these medications and of the existence of this [id number]. In the event of an emergency, I will provide the same information to the emergency department prescribers.  I will keep my provider updated on the pharmacy I am using for controlled medication prescription filling. Initials:_______  4. MY RESPONSIBILITY FOR PROTECTING MEDICATIONS   I will protect my prescriptions and medications. I understand that lost or misplaced prescriptions will not be replaced.  I will keep medications only for my own use and will not share them with others. I will keep all medications away from children.  I agree that if my medications are adjusted or discontinued, I will properly dispose of any remaining medications. I understand that I will be required to dispose of any remaining controlled medications as, directed by my prescriber, prior to being provided with any prescriptions for other controlled medications.   Medication drop box locations can be found at: [employer]    5. MY RESPONSIBILITY WITH ILLEGAL DRUGS    I will not use illegal or street drugs or another person's prescription medications not prescribed to me.  If there are identified addiction type symptoms, then referral to a program may be provided by my provider and I agree to follow through with this recommendation. 6. MY RESPONSIBILITY FOR COOPERATION WITH INVESTIGATIONS   I understand that my provider will comply with any applicable law and may discuss my use and/or possible misuse/abuse of controlled substances and alcohol, as appropriate, with any health care provider involved in my care, pharmacist, or legal authority.  I authorize my provider and pharmacy to cooperate fully with law enforcement agencies (as permitted by law) in the investigation of any possible misuse, sale, or other diversion of my controlled substances.  I agree to waive any applicable privilege or right of privacy or confidentiality with respect to these authorizations. 7. PROVIDERS RIGHT TO MONITOR FOR SAFETY: PRESCRIPTION MONITORING / DRUG TESTING   I consent to drug/toxicology screening and will submit to a drug screen upon my providers request to assure I am only taking the prescribed drugs for my safety monitoring. I understand that a drug screen is a laboratory test in which a sample of my urine, blood or saliva is checked to see what drugs I have been taking. This may entail an observed urine specimen, which means that a nurse or other health care provider may watch me provide urine, and I will cooperate if I am asked to provide an observed specimen.  I understand that my provider will check a copy of my State Prescription Monitoring Program () Report in order to safely prescribe medications.  Pill Counts: I consent to pill counts when requested.   I may be asked to bring all my prescribed contact my HIPAA contact if there are concerns about my safety and use of the controlled medications. I have agreed to use the prescribed controlled substance medications to me as instructed by my provider and as stated in this Medication Agreement. My initial on each page and my signature below shows that I have read each page and I have had the opportunity to ask questions with answers provided by my provider.     Patient Name (Printed): _____________________________________  Patient Signature:  ______________________   Date: _____________    Prescriber Name (Printed): ___________________________________  Prescriber Signature: _____________________  Date: _____________

## 2022-09-21 ASSESSMENT — ENCOUNTER SYMPTOMS
SORE THROAT: 0
ABDOMINAL PAIN: 0
WHEEZING: 0
SHORTNESS OF BREATH: 0

## 2022-09-27 DIAGNOSIS — I10 ESSENTIAL HYPERTENSION: ICD-10-CM

## 2022-09-27 RX ORDER — LABETALOL 200 MG/1
200 TABLET, FILM COATED ORAL DAILY
Qty: 90 TABLET | Refills: 1 | Status: SHIPPED | OUTPATIENT
Start: 2022-09-27 | End: 2022-12-26

## 2022-11-22 ENCOUNTER — TELEPHONE (OUTPATIENT)
Dept: PRIMARY CARE CLINIC | Age: 32
End: 2022-11-22

## 2022-11-22 ENCOUNTER — OFFICE VISIT (OUTPATIENT)
Dept: FAMILY MEDICINE CLINIC | Age: 32
End: 2022-11-22
Payer: COMMERCIAL

## 2022-11-22 VITALS
HEIGHT: 68 IN | BODY MASS INDEX: 28.04 KG/M2 | WEIGHT: 185 LBS | HEART RATE: 72 BPM | OXYGEN SATURATION: 98 % | SYSTOLIC BLOOD PRESSURE: 149 MMHG | TEMPERATURE: 97.9 F | DIASTOLIC BLOOD PRESSURE: 90 MMHG

## 2022-11-22 DIAGNOSIS — J01.90 ACUTE BACTERIAL SINUSITIS: Primary | ICD-10-CM

## 2022-11-22 DIAGNOSIS — B96.89 ACUTE BACTERIAL SINUSITIS: Primary | ICD-10-CM

## 2022-11-22 DIAGNOSIS — T36.95XA ANTIBIOTIC-INDUCED YEAST INFECTION: ICD-10-CM

## 2022-11-22 DIAGNOSIS — B37.9 ANTIBIOTIC-INDUCED YEAST INFECTION: ICD-10-CM

## 2022-11-22 PROCEDURE — 3078F DIAST BP <80 MM HG: CPT | Performed by: NURSE PRACTITIONER

## 2022-11-22 PROCEDURE — 99213 OFFICE O/P EST LOW 20 MIN: CPT | Performed by: NURSE PRACTITIONER

## 2022-11-22 PROCEDURE — 3074F SYST BP LT 130 MM HG: CPT | Performed by: NURSE PRACTITIONER

## 2022-11-22 RX ORDER — AMOXICILLIN AND CLAVULANATE POTASSIUM 875; 125 MG/1; MG/1
1 TABLET, FILM COATED ORAL 2 TIMES DAILY
Qty: 20 TABLET | Refills: 0 | Status: SHIPPED | OUTPATIENT
Start: 2022-11-22 | End: 2022-12-02

## 2022-11-22 RX ORDER — FLUCONAZOLE 150 MG/1
150 TABLET ORAL DAILY
Qty: 1 TABLET | Refills: 0 | Status: SHIPPED | OUTPATIENT
Start: 2022-11-22 | End: 2022-11-23

## 2022-11-22 ASSESSMENT — ENCOUNTER SYMPTOMS
COUGH: 1
SWOLLEN GLANDS: 0
HOARSE VOICE: 0
SORE THROAT: 1
SHORTNESS OF BREATH: 0
SINUS COMPLAINT: 1
SINUS PRESSURE: 1

## 2022-11-22 NOTE — PROGRESS NOTES
[facility]  [facility] [address] [address]  Dept: [phone]  Dept Fax: [phone]    Connie Marcial is a 34 y.o. female who presents to the urgent care today for her medical conditions/complaints as notedbelow. Connie Marcial is c/o of Sinus Problem (This has been going on for about 2 weeks. Face pain/), Otalgia (Bilateral, more in the left. ), and Cough      HPI:     34 yr old female presents for sinus sx, earache (moreso to left) and cough. No wheezing or sob  Works as Pharmacy tech for [employer]      Sinus Problem  This is a new problem. The current episode started 1 to 4 weeks ago. The problem has been gradually worsening since onset. There has been no fever. The pain is mild. Associated symptoms include chills, congestion, coughing, diaphoresis, ear pain, headaches, sinus pressure, sneezing and a sore throat. Pertinent negatives include no hoarse voice, neck pain, shortness of breath or swollen glands. Past treatments include oral decongestants. The treatment provided mild relief. Past Medical History:   Diagnosis Date    Abnormal Pap smear of cervix Unknown    Anemia Unknown    Chlamydia 2021    Dislocation, hip ([address]     Hypertension 6 years ago    Murmur     STD (sexually transmitted disease) 2021    Vasovagal near syncope         Current Outpatient Medications   Medication Sig Dispense Refill    amoxicillin-clavulanate (AUGMENTIN) 875-125 MG per tablet Take 1 tablet by mouth 2 times daily for 10 days 20 tablet 0    fluconazole (DIFLUCAN) 150 MG tablet Take 1 tablet by mouth daily for 1 dose 1 tablet 0    labetalol (NORMODYNE) 200 MG tablet Take 1 tablet by mouth daily 90 tablet 1    amphetamine-dextroamphetamine (ADDERALL XR) 5 MG extended release capsule Take 1 capsule by mouth daily for 30 days.  30 capsule 0    esomeprazole (NEXIUM) 20 MG delayed release capsule TAKE 1 CAPSULE BY MOUTH ONCE DAILY. 30 capsule 3    hydroCHLOROthiazide (HYDRODIURIL) 12.5 MG tablet TAKE 1 TABLET (12.5MG) BY MOUTH ONCE DAILY. 90 tablet 1    norethindrone-ethinyl estradiol-Fe (LO LOESTRIN FE) 1 MG-10 MCG / 10 MCG tablet Take 1 tablet by mouth daily 28 tablet 11    Multiple Vitamins-Minerals (THERAPEUTIC MULTIVITAMIN-MINERALS) tablet Take 1 tablet by mouth daily      Cetirizine HCl (ZYRTEC PO) Take by mouth      Probiotic Product (PROBIOTIC PO) Take by mouth      amphetamine-dextroamphetamine (ADDERALL XR) 5 MG extended release capsule  (Patient not taking: Reported on 11/22/2022)      amphetamine-dextroamphetamine (ADDERALL XR) 5 MG extended release capsule Take 1 capsule by mouth daily for 30 days. (Patient not taking: Reported on 11/22/2022) 30 capsule 0    amphetamine-dextroamphetamine (ADDERALL XR) 5 MG extended release capsule Take 1 capsule by mouth daily for 30 days. (Patient not taking: Reported on 11/22/2022) 30 capsule 0     No current facility-administered medications for this visit. No Known Allergies    Subjective:      Review of Systems   Constitutional:  Positive for chills and diaphoresis. HENT:  Positive for congestion, ear pain, sinus pressure, sneezing and sore throat. Negative for hoarse voice. Respiratory:  Positive for cough. Negative for shortness of breath. Musculoskeletal:  Negative for neck pain. Neurological:  Positive for headaches. All other systems reviewed and are negative. 14 systems reviewed and negative except as listed in HPI. Objective:     Physical Exam  Vitals and nursing note reviewed. Constitutional:       General: She is not in acute distress. Appearance: Normal appearance. She is not ill-appearing, toxic-appearing or diaphoretic. HENT:      Head: Normocephalic and atraumatic.       Right Ear: Ear canal and external ear normal.      Left Ear: Ear canal and external ear normal.      Ears:      Comments: Thiago tm with [facility], no injection     Nose: Congestion present. No rhinorrhea. Comments: lida maxillary sinus tenderness  No facial swelling or erythema     Mouth/Throat:      Mouth: Mucous membranes are moist.      Pharynx: No oropharyngeal exudate or posterior oropharyngeal erythema. Comments: + cobblestoning - thin yellow  Uvula midline no edema  Handling oral secretions without difficulty  Eyes:      General: No scleral icterus. Right eye: No discharge. Left eye: No discharge. Conjunctiva/sclera: Conjunctivae normal.      Pupils: Pupils are equal, round, and reactive to light. Cardiovascular:      Rate and Rhythm: Normal rate and regular rhythm. Pulses: Normal pulses. Heart sounds: Normal heart sounds. Pulmonary:      Effort: Pulmonary effort is normal. No respiratory distress. Breath sounds: Normal breath sounds. No stridor. No wheezing, rhonchi or rales. Abdominal:      General: Bowel sounds are normal.      Palpations: Abdomen is soft. Musculoskeletal:         General: No signs of injury. Cervical back: Neck supple. Comments: Ambulated to and from room, gait steady, moving all ext without difficulty   Skin:     General: Skin is warm and dry. Capillary Refill: Capillary refill takes less than 2 seconds. Findings: No rash ( no rash to visible skin). Neurological:      General: No focal deficit present. Mental Status: She is alert and oriented to person, place, and time. Psychiatric:         Mood and Affect: Mood normal.         Behavior: Behavior normal.     BP (!) 149/90 (Site: Left Upper Arm, Position: Sitting, Cuff Size: Medium Adult)   Pulse 72   Temp 97.9 °F (36.6 °C) (Temporal)   Ht 5' 8\" (1.727 m)   Wt 185 lb (83.9 kg)   SpO2 98%   BMI 28.13 kg/m²     Assessment:       Diagnosis Orders   1.  Acute bacterial sinusitis            Plan:    Sinus sx cpl weeks  No fevers, well appearing  Based on sx and clinical exam findings will tx as bacterial sinusitis  Aug rx  Cont home sudafed  Increase zyrtec to bid while uri sx last  Diflucan rx for hx antibiotic induced yeast infection  Reviewed over-the-counter treatments for symptom management. Sx cpl weeks, covid testing not warranted  Return for Make an Appt. with your Primary Care in 1 week. Orders Placed This Encounter   Medications    amoxicillin-clavulanate (AUGMENTIN) 875-125 MG per tablet     Sig: Take 1 tablet by mouth 2 times daily for 10 days     Dispense:  20 tablet     Refill:  0    fluconazole (DIFLUCAN) 150 MG tablet     Sig: Take 1 tablet by mouth daily for 1 dose     Dispense:  1 tablet     Refill:  0         Patient given educational materials - see patient instructions. Discussed use, benefit, and side effects of prescribed medications. All patient questions answered. Pt voicedunderstanding.     Electronically signed by TREY Romano CNP on 11/22/2022 at 1:58 PM

## 2022-11-22 NOTE — TELEPHONE ENCOUNTER
Patient has been sick and is unable to go to urgent care due to work hours. Officered appointment with walk in clinic and patient declined. Let her know that typically she would need to be seen if and before any medications are sent but I would sent a note back to  letting to know to see what he advises her to do. She works everyday from [place] [id number] everyday of the week. She is wondering if there is anything that can be called in for her to help with her symptoms. Symptoms: Extreme sinus pressure, swollen face, ear pain, cough, sore throat. No fever. Had body aches last week but has improved since.      Duration: 2 weeks

## 2022-11-23 NOTE — TELEPHONE ENCOUNTER
LM for patient stating that Dr Larry Boyd said she could try Zyrtec-D from the pharmacy. Also Per Doctor:   Encouraged Vitamin D Pulse Therapy 10,000 IU Vitamin D x 3 days, 1,000mg BID Vitamin C, Listerine Gargle, and increase fluids. Encouraged patient to call if further questions or concerns arise.   Advised patient that our walk in is open Friday from 10-4 if symptoms do not improve and patient needs to be seen

## 2022-12-08 ENCOUNTER — OFFICE VISIT (OUTPATIENT)
Dept: PRIMARY CARE CLINIC | Age: 32
End: 2022-12-08
Payer: COMMERCIAL

## 2022-12-08 VITALS
HEIGHT: 68 IN | DIASTOLIC BLOOD PRESSURE: 90 MMHG | OXYGEN SATURATION: 98 % | SYSTOLIC BLOOD PRESSURE: 150 MMHG | BODY MASS INDEX: 27.55 KG/M2 | WEIGHT: 181.8 LBS | HEART RATE: 73 BPM

## 2022-12-08 DIAGNOSIS — K21.9 GASTROESOPHAGEAL REFLUX DISEASE WITHOUT ESOPHAGITIS: ICD-10-CM

## 2022-12-08 DIAGNOSIS — F90.2 ATTENTION DEFICIT HYPERACTIVITY DISORDER (ADHD), COMBINED TYPE: ICD-10-CM

## 2022-12-08 DIAGNOSIS — I10 ESSENTIAL HYPERTENSION: Primary | ICD-10-CM

## 2022-12-08 PROCEDURE — 3074F SYST BP LT 130 MM HG: CPT | Performed by: FAMILY MEDICINE

## 2022-12-08 PROCEDURE — 99214 OFFICE O/P EST MOD 30 MIN: CPT | Performed by: FAMILY MEDICINE

## 2022-12-08 PROCEDURE — 3078F DIAST BP <80 MM HG: CPT | Performed by: FAMILY MEDICINE

## 2022-12-08 RX ORDER — LABETALOL 200 MG/1
200 TABLET, FILM COATED ORAL DAILY
Qty: 90 TABLET | Refills: 1 | Status: SHIPPED | OUTPATIENT
Start: 2022-12-08 | End: 2023-03-08

## 2022-12-08 RX ORDER — HYDROCHLOROTHIAZIDE 12.5 MG/1
TABLET ORAL
Qty: 90 TABLET | Refills: 1 | Status: SHIPPED | OUTPATIENT
Start: 2022-12-08

## 2022-12-08 RX ORDER — SPIRONOLACTONE 25 MG/1
25 TABLET ORAL DAILY
Qty: 30 TABLET | Refills: 0 | Status: SHIPPED | OUTPATIENT
Start: 2022-12-08 | End: 2022-12-08 | Stop reason: CLARIF

## 2022-12-08 RX ORDER — SPIRONOLACTONE 25 MG/1
25 TABLET ORAL DAILY
Qty: 30 TABLET | Refills: 5 | Status: SHIPPED | OUTPATIENT
Start: 2022-12-08

## 2022-12-08 ASSESSMENT — PATIENT HEALTH QUESTIONNAIRE - PHQ9
1. LITTLE INTEREST OR PLEASURE IN DOING THINGS: 0
SUM OF ALL RESPONSES TO PHQ QUESTIONS 1-9: 0
2. FEELING DOWN, DEPRESSED OR HOPELESS: 0
SUM OF ALL RESPONSES TO PHQ QUESTIONS 1-9: 0
SUM OF ALL RESPONSES TO PHQ QUESTIONS 1-9: 0
SUM OF ALL RESPONSES TO PHQ9 QUESTIONS 1 & 2: 0
SUM OF ALL RESPONSES TO PHQ QUESTIONS 1-9: 0

## 2022-12-08 NOTE — PROGRESS NOTES
Subjective:     Patient ID: Antwan Cuevas is a 34 y.o. female    HPI  Desiree Jackson returns today for follow-up on her hypertension and her ADHD. She has been taking labetalol 200 mg daily as well as hydrochlorothiazide 12.5 mg for her blood pressure. Try and avoid excess caffeine and added salt. Denies any chest pain shortness of breath palpitations. Blood pressure is elevated today but she has not yet taken her medications. Believes the ADHD is under good control with Adderall but would like to consider and talk about an increased dose. Reluctant to increase the dose with her blood pressure been elevated today. She expressed understanding    Review of Systems   Constitutional:  Negative for fever. HENT:  Negative for congestion, ear pain and sore throat. Respiratory:  Negative for shortness of breath and wheezing. Cardiovascular:  Negative for chest pain and leg swelling. Gastrointestinal:  Negative for abdominal pain. Genitourinary:  Negative for dysuria. Skin:  Negative for rash. Neurological:  Negative for syncope. Hematological:  Negative for adenopathy. Psychiatric/Behavioral:  Positive for decreased concentration. Negative for sleep disturbance. The patient is hyperactive. The patient is not nervous/anxious. Objective:     Physical Exam  Vitals and nursing note reviewed. Constitutional:       Appearance: Normal appearance. HENT:      Head: Normocephalic. Right Ear: External ear normal.      Left Ear: External ear normal.      Nose: Nose normal.      Mouth/Throat:      Mouth: Mucous membranes are moist.      Pharynx: Oropharynx is clear. Eyes:      Conjunctiva/sclera: Conjunctivae normal.      Pupils: Pupils are equal, round, and reactive to light. Cardiovascular:      Rate and Rhythm: Normal rate and regular rhythm. Pulses: Normal pulses. Heart sounds: Normal heart sounds. No murmur heard.   Pulmonary:      Effort: Pulmonary effort is normal.      Breath sounds: Normal breath sounds. No wheezing. Musculoskeletal:         General: Normal range of motion. Cervical back: Neck supple. Right lower leg: No edema. Left lower leg: No edema. Lymphadenopathy:      Cervical: No cervical adenopathy. Skin:     General: Skin is warm and dry. Capillary Refill: Capillary refill takes less than 2 seconds. Neurological:      General: No focal deficit present. Mental Status: She is alert and oriented to person, place, and time. Psychiatric:         Mood and Affect: Mood normal.         Behavior: Behavior normal.       Assessment/Plan:     1. Essential hypertension    2. Attention deficit hyperactivity disorder (ADHD), combined type    3. Gastroesophageal reflux disease without esophagitis          Hi Villa was seen today for adhd and hypertension. Diagnoses and all orders for this visit:    Essential hypertension  -     hydroCHLOROthiazide (HYDRODIURIL) 12.5 MG tablet; TAKE 1 TABLET (12.5MG) BY MOUTH ONCE DAILY. -     labetalol (NORMODYNE) 200 MG tablet; Take 1 tablet by mouth daily  Add spironolactone 25 mg daily. If blood pressure is better controlled we will consider combination product  Attention deficit hyperactivity disorder (ADHD), combined type  Continue Adderall same dose for now  Gastroesophageal reflux disease without esophagitis  -     esomeprazole (NEXIUM) 20 MG delayed release capsule; TAKE 1 CAPSULE BY MOUTH ONCE DAILY. Other orders  -     spironolactone (ALDACTONE) 25 MG tablet; Take 1 tablet by mouth daily          Oswaldo Gaytan MD    Please note that this chart was generated using voice recognition Dragon dictation software. Although every effort was made to ensure the accuracy of this automated transcription, some errors in transcription may have occurred.

## 2022-12-12 ASSESSMENT — ENCOUNTER SYMPTOMS
SORE THROAT: 0
WHEEZING: 0
ABDOMINAL PAIN: 0
SHORTNESS OF BREATH: 0

## 2022-12-23 DIAGNOSIS — F90.2 ATTENTION DEFICIT HYPERACTIVITY DISORDER (ADHD), COMBINED TYPE: ICD-10-CM

## 2022-12-23 RX ORDER — DEXTROAMPHETAMINE SACCHARATE, AMPHETAMINE ASPARTATE MONOHYDRATE, DEXTROAMPHETAMINE SULFATE AND AMPHETAMINE SULFATE 1.25; 1.25; 1.25; 1.25 MG/1; MG/1; MG/1; MG/1
CAPSULE, EXTENDED RELEASE ORAL
Qty: 30 CAPSULE | Refills: 0 | Status: SHIPPED | OUTPATIENT
Start: 2022-12-23 | End: 2023-01-22

## 2023-01-11 ENCOUNTER — HOSPITAL ENCOUNTER (OUTPATIENT)
Age: 33
Setting detail: SPECIMEN
Discharge: HOME | End: 2023-01-11

## 2023-01-11 ENCOUNTER — OFFICE VISIT (OUTPATIENT)
Dept: PRIMARY CARE CLINIC | Age: 33
End: 2023-01-11
Payer: COMMERCIAL

## 2023-01-11 VITALS
HEART RATE: 74 BPM | HEIGHT: 68 IN | DIASTOLIC BLOOD PRESSURE: 84 MMHG | SYSTOLIC BLOOD PRESSURE: 126 MMHG | WEIGHT: 184 LBS | BODY MASS INDEX: 27.89 KG/M2 | OXYGEN SATURATION: 97 %

## 2023-01-11 DIAGNOSIS — F90.2 ATTENTION DEFICIT HYPERACTIVITY DISORDER (ADHD), COMBINED TYPE: ICD-10-CM

## 2023-01-11 DIAGNOSIS — R00.2 PALPITATIONS: ICD-10-CM

## 2023-01-11 DIAGNOSIS — I10 ESSENTIAL HYPERTENSION: Primary | ICD-10-CM

## 2023-01-11 DIAGNOSIS — I10 ESSENTIAL HYPERTENSION: ICD-10-CM

## 2023-01-11 LAB
ABSOLUTE EOS #: 0.06 K/UL (ref 0–0.44)
ABSOLUTE IMMATURE GRANULOCYTE: <0.03 K/UL (ref 0–0.3)
ABSOLUTE LYMPH #: 1.95 K/UL (ref 1.1–3.7)
ABSOLUTE MONO #: 0.63 K/UL (ref 0.1–1.2)
ANION GAP SERPL CALCULATED.3IONS-SCNC: 11 MMOL/L (ref 9–17)
BASOPHILS # BLD: 1 % (ref 0–2)
BASOPHILS ABSOLUTE: 0.04 K/UL (ref 0–0.2)
BUN BLDV-MCNC: 11 MG/DL (ref 6–20)
CALCIUM SERPL-MCNC: 9.4 MG/DL (ref 8.6–10.4)
CHLORIDE BLD-SCNC: 103 MMOL/L (ref 98–107)
CO2: 24 MMOL/L (ref 20–31)
CREAT SERPL-MCNC: 0.77 MG/DL (ref 0.5–0.9)
EOSINOPHILS RELATIVE PERCENT: 1 % (ref 1–4)
GFR SERPL CREATININE-BSD FRML MDRD: >60 ML/MIN/1.73M2
GLUCOSE BLD-MCNC: 86 MG/DL (ref 70–99)
HCT VFR BLD CALC: 37.3 % (ref 36.3–47.1)
HEMOGLOBIN: 12.2 G/DL (ref 11.9–15.1)
IMMATURE GRANULOCYTES: 0 %
LYMPHOCYTES # BLD: 24 % (ref 24–43)
MCH RBC QN AUTO: 29.5 PG (ref 25.2–33.5)
MCHC RBC AUTO-ENTMCNC: 32.7 G/DL (ref 28.4–34.8)
MCV RBC AUTO: 90.3 FL (ref 82.6–102.9)
MONOCYTES # BLD: 8 % (ref 3–12)
NRBC AUTOMATED: 0 PER 100 WBC
PDW BLD-RTO: 11.8 % (ref 11.8–14.4)
PLATELET # BLD: 292 K/UL (ref 138–453)
PMV BLD AUTO: 11.3 FL (ref 8.1–13.5)
POTASSIUM SERPL-SCNC: 4.7 MMOL/L (ref 3.7–5.3)
RBC # BLD: 4.13 M/UL (ref 3.95–5.11)
SEG NEUTROPHILS: 66 % (ref 36–65)
SEGMENTED NEUTROPHILS ABSOLUTE COUNT: 5.29 K/UL (ref 1.5–8.1)
SODIUM BLD-SCNC: 138 MMOL/L (ref 135–144)
WBC # BLD: 8 K/UL (ref 3.5–11.3)

## 2023-01-11 PROCEDURE — 99214 OFFICE O/P EST MOD 30 MIN: CPT | Performed by: FAMILY MEDICINE

## 2023-01-11 PROCEDURE — 3079F DIAST BP 80-89 MM HG: CPT | Performed by: FAMILY MEDICINE

## 2023-01-11 PROCEDURE — 3074F SYST BP LT 130 MM HG: CPT | Performed by: FAMILY MEDICINE

## 2023-01-11 RX ORDER — DEXTROAMPHETAMINE SACCHARATE, AMPHETAMINE ASPARTATE MONOHYDRATE, DEXTROAMPHETAMINE SULFATE AND AMPHETAMINE SULFATE 1.25; 1.25; 1.25; 1.25 MG/1; MG/1; MG/1; MG/1
5 CAPSULE, EXTENDED RELEASE ORAL DAILY
Qty: 30 CAPSULE | Refills: 0 | Status: SHIPPED | OUTPATIENT
Start: 2023-01-22 | End: 2023-02-21

## 2023-01-11 RX ORDER — SPIRONOLACTONE 25 MG/1
25 TABLET ORAL DAILY
Qty: 30 TABLET | Refills: 5 | Status: SHIPPED | OUTPATIENT
Start: 2023-01-11

## 2023-01-11 ASSESSMENT — ENCOUNTER SYMPTOMS
SORE THROAT: 0
ABDOMINAL PAIN: 0
WHEEZING: 0
SHORTNESS OF BREATH: 0

## 2023-01-11 ASSESSMENT — PATIENT HEALTH QUESTIONNAIRE - PHQ9
1. LITTLE INTEREST OR PLEASURE IN DOING THINGS: 0
SUM OF ALL RESPONSES TO PHQ9 QUESTIONS 1 & 2: 0
SUM OF ALL RESPONSES TO PHQ QUESTIONS 1-9: 0
2. FEELING DOWN, DEPRESSED OR HOPELESS: 0
SUM OF ALL RESPONSES TO PHQ QUESTIONS 1-9: 0

## 2023-01-11 NOTE — PROGRESS NOTES
Subjective:     Patient ID: Danielle Solis is a 34 y.o. female    HPI  Patient returns today for follow-up on her ADHD and also her hypertension. At her last visit we added spironolactone to her regimen. Her blood pressure is much better. However since we added this medicine she has developed some palpitations. She has some associated lightheadedness. Does not seem to have any rhyme or reason to it. No real chest pain. She does have a history of vasovagal syncope and some history of probable autonomic nervous system dysfunction. Denies headache slurred speech syncope. We also talked about other side effects of spironolactone long-term and that most probably it should be discontinued when she is thinking about pregnancy. She is currently on birth control pills  With a good discussion in regard to her treatment plan. Recommend that we check her electrolytes today to look for imbalance and also add magnesium 400 mg 2 hours before bedtime. If the lab work is normal and she responds to the magnesium over the next 2 weeks only well enough alone. Or if she continues to have symptoms would recommend to cut back on his frontal lactone to every other day. She does not feel that the palpitations are influenced by her Adderall medication whatsoever. She also feels some benefit with the Adderall and I think we will keep the dose the same with all things considered today. Review of Systems   Constitutional:  Negative for fever. HENT:  Negative for congestion, ear pain and sore throat. Respiratory:  Negative for shortness of breath and wheezing. Cardiovascular:  Positive for palpitations. Negative for chest pain and leg swelling. Gastrointestinal:  Negative for abdominal pain. Genitourinary:  Negative for dysuria. Skin:  Negative for rash. Neurological:  Positive for light-headedness. Negative for syncope. Hematological:  Negative for adenopathy.    Psychiatric/Behavioral:  Positive for decreased concentration. Negative for sleep disturbance. The patient is not nervous/anxious. Objective:     Physical Exam  Vitals and nursing note reviewed. Constitutional:       General: She is not in acute distress. Appearance: Normal appearance. HENT:      Head: Normocephalic. Right Ear: External ear normal.      Left Ear: External ear normal.      Nose: Nose normal.      Mouth/Throat:      Mouth: Mucous membranes are moist.   Eyes:      Conjunctiva/sclera: Conjunctivae normal.   Cardiovascular:      Rate and Rhythm: Normal rate and regular rhythm. Heart sounds: Normal heart sounds. No murmur heard. No gallop. Pulmonary:      Effort: Pulmonary effort is normal.      Breath sounds: Normal breath sounds. Musculoskeletal:      Cervical back: Normal range of motion. Right lower leg: No edema. Left lower leg: No edema. Skin:     General: Skin is warm and dry. Neurological:      General: No focal deficit present. Mental Status: She is alert and oriented to person, place, and time. Psychiatric:         Mood and Affect: Mood normal.         Behavior: Behavior normal.     She was told that she has a diastolic heart murmur but I do not hear it today  Assessment/Plan:     1. Essential hypertension    2. Attention deficit hyperactivity disorder (ADHD), combined type    3. Palpitations          Froy Alexander was seen today for hypertension and adhd. Diagnoses and all orders for this visit:    Essential hypertension  -     CBC with Auto Differential; Future  -     Basic Metabolic Panel; Future  -     spironolactone (ALDACTONE) 25 MG tablet; Take 1 tablet by mouth daily    Attention deficit hyperactivity disorder (ADHD), combined type  -     CBC with Auto Differential; Future  -     Basic Metabolic Panel; Future  -     amphetamine-dextroamphetamine (ADDERALL XR) 5 MG extended release capsule; Take 1 capsule by mouth daily for 30 days.  Max Daily Amount: 5 mg    Palpitations  -     CBC with Auto Differential; Future  -     Basic Metabolic Panel; Future  -     spironolactone (ALDACTONE) 25 MG tablet; Take 1 tablet by mouth daily  Good response to the spironolactone in terms of her blood pressure control. Still concerned about her palpitations. Recommend that we check her lab work to look for imbalance and also like magnesium 4 mg as well as before bedtime. Would consider decreasing spironolactone to every other day. Spent at least 30 minutes including time with the patient, reviewing the patient's chart, reviewing lab work / images ,considering changes in treatment plan, patient education and documentation of the record. Deedee Gtz MD    Please note that this chart was generated using voice recognition Dragon dictation software. Although every effort was made to ensure the accuracy of this automated transcription, some errors in transcription may have occurred.

## 2023-01-27 DIAGNOSIS — F90.2 ATTENTION DEFICIT HYPERACTIVITY DISORDER (ADHD), COMBINED TYPE: ICD-10-CM

## 2023-01-27 DIAGNOSIS — K21.9 GASTROESOPHAGEAL REFLUX DISEASE WITHOUT ESOPHAGITIS: ICD-10-CM

## 2023-01-27 RX ORDER — DEXTROAMPHETAMINE SACCHARATE, AMPHETAMINE ASPARTATE MONOHYDRATE, DEXTROAMPHETAMINE SULFATE AND AMPHETAMINE SULFATE 1.25; 1.25; 1.25; 1.25 MG/1; MG/1; MG/1; MG/1
CAPSULE, EXTENDED RELEASE ORAL
Qty: 30 CAPSULE | Refills: 0 | OUTPATIENT
Start: 2023-01-27

## 2023-03-04 ENCOUNTER — PATIENT MESSAGE (OUTPATIENT)
Dept: PRIMARY CARE CLINIC | Age: 33
End: 2023-03-04

## 2023-03-06 DIAGNOSIS — F90.2 ATTENTION DEFICIT HYPERACTIVITY DISORDER (ADHD), COMBINED TYPE: ICD-10-CM

## 2023-03-06 RX ORDER — DEXTROAMPHETAMINE SACCHARATE, AMPHETAMINE ASPARTATE MONOHYDRATE, DEXTROAMPHETAMINE SULFATE AND AMPHETAMINE SULFATE 1.25; 1.25; 1.25; 1.25 MG/1; MG/1; MG/1; MG/1
5 CAPSULE, EXTENDED RELEASE ORAL EVERY MORNING
Qty: 30 CAPSULE | Refills: 0 | Status: SHIPPED | OUTPATIENT
Start: 2023-03-06 | End: 2023-04-05

## 2023-03-06 NOTE — TELEPHONE ENCOUNTER
From: Alina Aguilar  To: Dr. Malgorzata Santiago: 3/4/2023 7:56 AM EST  Subject: Office visit 4/17/23    I would like to get [facility] screening in the office when I come in for my checkup please

## 2023-03-07 DIAGNOSIS — K21.9 GASTROESOPHAGEAL REFLUX DISEASE WITHOUT ESOPHAGITIS: ICD-10-CM

## 2023-03-08 NOTE — TELEPHONE ENCOUNTER
Last visit: 1/11/23    Last Med refill: 12/8/22  Does patient have enough medication for 72 hours    Next Visit Date:  Future Appointments   Date Time Provider Gianna Cornejo   4/17/2023  7:30 AM Marley Hanson MD [facility]   Topic Date Due    COVID-19 Vaccine (2 - Booster for Dimitri series) 05/04/2021    Depression Screen  01/11/2024    Cervical cancer screen  02/10/2026    DTaP/Tdap/Td vaccine (4 - Td or Tdap) 02/10/2031    Flu vaccine  Completed    Hepatitis A vaccine  Aged Out    Hib vaccine  Aged Out    Meningococcal (ACWY) vaccine  Aged Out    Pneumococcal 0-64 years Vaccine  Aged Out    Varicella vaccine  Discontinued    Hepatitis C screen  Discontinued    HIV screen  Discontinued       Hemoglobin A1C (%)   Date Value   08/17/2022 5.2             ( goal A1C is < 7)   No results found for: LABMICR  LDL Cholesterol (mg/dL)   Date Value   06/14/2022 118   07/08/2021 94       (goal LDL is <100)   BUN (mg/dL)   Date Value   01/11/2023 11     BP Readings from Last 3 Encounters:   01/11/23 126/84   12/08/22 (!) 150/90   11/22/22 (!) 149/90          (goal 120/80)    All Future Testing planned in [facility]  Lab Frequency Next Occurrence   PAP Smear Once 08/17/2022               Patient Active Problem List:     Essential hypertension     Pityriasis rosea     Gastroesophageal reflux disease without esophagitis     Plantar fasciitis     Hypertriglyceridemia     BMI 28.0-28.9,adult

## 2023-04-03 DIAGNOSIS — F90.2 ATTENTION DEFICIT HYPERACTIVITY DISORDER (ADHD), COMBINED TYPE: ICD-10-CM

## 2023-04-03 NOTE — TELEPHONE ENCOUNTER
Last visit: 1/11/23    Last Med refill: 3/6/23  Does patient have enough medication for 72 hours:Next Visit Date:  Future Appointments   Date Time Provider Gianna Cornejo   4/17/2023  7:30 AM Al Nye MD [facility]   Topic Date Due    COVID-19 Vaccine (2 - Booster for Dimitri series) 05/04/2021    Depression Screen  01/11/2024    Cervical cancer screen  02/10/2026    DTaP/Tdap/Td vaccine (4 - Td or Tdap) 02/10/2031    Flu vaccine  Completed    Hepatitis A vaccine  Aged Out    Hib vaccine  Aged Out    Meningococcal (ACWY) vaccine  Aged Out    Pneumococcal 0-64 years Vaccine  Aged Out    Hepatitis B vaccine  Discontinued    Varicella vaccine  Discontinued    Hepatitis C screen  Discontinued    HIV screen  Discontinued       Hemoglobin A1C (%)   Date Value   08/17/2022 5.2             ( goal A1C is < 7)   No results found for: LABMICR  LDL Cholesterol (mg/dL)   Date Value   06/14/2022 118   07/08/2021 94       (goal LDL is <100)   BUN (mg/dL)   Date Value   01/11/2023 11     BP Readings from Last 3 Encounters:   01/11/23 126/84   12/08/22 (!) 150/90   11/22/22 (!) 149/90          (goal 120/80)    All Future Testing planned in [facility]  Lab Frequency Next Occurrence   PAP Smear Once 08/17/2022               Patient Active Problem List:     Essential hypertension     Pityriasis rosea     Gastroesophageal reflux disease without esophagitis     Plantar fasciitis     Hypertriglyceridemia     BMI 28.0-28.9,adult

## 2023-04-04 RX ORDER — DEXTROAMPHETAMINE SACCHARATE, AMPHETAMINE ASPARTATE MONOHYDRATE, DEXTROAMPHETAMINE SULFATE AND AMPHETAMINE SULFATE 1.25; 1.25; 1.25; 1.25 MG/1; MG/1; MG/1; MG/1
CAPSULE, EXTENDED RELEASE ORAL
Qty: 30 CAPSULE | Refills: 0 | Status: SHIPPED | OUTPATIENT
Start: 2023-04-04 | End: 2023-05-04

## 2023-04-17 ENCOUNTER — OFFICE VISIT (OUTPATIENT)
Dept: PRIMARY CARE CLINIC | Age: 33
End: 2023-04-17
Payer: COMMERCIAL

## 2023-04-17 VITALS
BODY MASS INDEX: 27.37 KG/M2 | DIASTOLIC BLOOD PRESSURE: 84 MMHG | SYSTOLIC BLOOD PRESSURE: 128 MMHG | WEIGHT: 180.6 LBS | OXYGEN SATURATION: 97 % | HEART RATE: 71 BPM | HEIGHT: 68 IN

## 2023-04-17 DIAGNOSIS — F90.2 ATTENTION DEFICIT HYPERACTIVITY DISORDER (ADHD), COMBINED TYPE: ICD-10-CM

## 2023-04-17 DIAGNOSIS — Z00.00 ENCOUNTER FOR WELL ADULT EXAM WITHOUT ABNORMAL FINDINGS: Primary | ICD-10-CM

## 2023-04-17 DIAGNOSIS — I10 ESSENTIAL HYPERTENSION: ICD-10-CM

## 2023-04-17 PROCEDURE — 3079F DIAST BP 80-89 MM HG: CPT | Performed by: FAMILY MEDICINE

## 2023-04-17 PROCEDURE — 99395 PREV VISIT EST AGE 18-39: CPT | Performed by: FAMILY MEDICINE

## 2023-04-17 PROCEDURE — 3074F SYST BP LT 130 MM HG: CPT | Performed by: FAMILY MEDICINE

## 2023-04-17 SDOH — ECONOMIC STABILITY: INCOME INSECURITY: HOW HARD IS IT FOR YOU TO PAY FOR THE VERY BASICS LIKE FOOD, HOUSING, MEDICAL CARE, AND HEATING?: NOT HARD AT ALL

## 2023-04-17 SDOH — ECONOMIC STABILITY: FOOD INSECURITY: WITHIN THE PAST 12 MONTHS, YOU WORRIED THAT YOUR FOOD WOULD RUN OUT BEFORE YOU GOT MONEY TO BUY MORE.: NEVER TRUE

## 2023-04-17 SDOH — ECONOMIC STABILITY: FOOD INSECURITY: WITHIN THE PAST 12 MONTHS, THE FOOD YOU BOUGHT JUST DIDN'T LAST AND YOU DIDN'T HAVE MONEY TO GET MORE.: NEVER TRUE

## 2023-04-17 SDOH — ECONOMIC STABILITY: HOUSING INSECURITY
IN THE LAST 12 MONTHS, WAS THERE A TIME WHEN YOU DID NOT HAVE A STEADY PLACE TO SLEEP OR SLEPT IN A SHELTER (INCLUDING NOW)?: NO

## 2023-04-17 ASSESSMENT — ENCOUNTER SYMPTOMS
BACK PAIN: 0
CHEST TIGHTNESS: 0
SORE THROAT: 0
SHORTNESS OF BREATH: 0
DIARRHEA: 0
CONSTIPATION: 0
ABDOMINAL PAIN: 0
COUGH: 0

## 2023-04-17 ASSESSMENT — PATIENT HEALTH QUESTIONNAIRE - PHQ9
1. LITTLE INTEREST OR PLEASURE IN DOING THINGS: 0
SUM OF ALL RESPONSES TO PHQ QUESTIONS 1-9: 0
SUM OF ALL RESPONSES TO PHQ9 QUESTIONS 1 & 2: 0
SUM OF ALL RESPONSES TO PHQ QUESTIONS 1-9: 0
2. FEELING DOWN, DEPRESSED OR HOPELESS: 0

## 2023-04-17 NOTE — PATIENT INSTRUCTIONS
improve health problems related to obesity (diabetes, high blood pressure, high cholesterol, sleep apnea) to the point that you need less or no medicine. Finally, weight loss surgery might reduce your risk of developing heart disease, cancer, and certain infections. AFTER WEIGHT LOSS SURGERY -- You will need to stay in the hospital until your team feels that it is safe for you to leave (on average, one to three days). Do not drive if you are taking prescription pain medicine. Begin exercising as soon as possible once you have healed; most weight loss centers will design an exercise program for you. Once you are home, it is important to eat and drink exactly what your doctor and dietitian recommend. You will see your doctor, nurse, and dietitian on a regular basis after surgery to monitor your health, diet, and weight loss. You will be able to slowly increase how much you eat over time, although it will always be important to:  Eat small, frequent meals and not skip meals   Chew your food slowly and completely   Avoid eating while \"distracted\" (such as eating while watching TV)   Stop eating when you feel full   Drink liquids at least 30 minutes before or after eating   Avoid foods high in fat or sugar   Take vitamin supplements, as recommended  It can take several months to learn to listen to your body so that you know when you are hungry and when you are full. You may dislike foods you previously loved, and you may begin to prefer new foods. This can be a frustrating process for some people, so talk to your dietitian if you are having trouble. It usually takes between one and two years to lose weight after surgery. After reaching their goal weight, some people have plastic surgery (called \"body contouring\") to remove excess skin from the body, particularly in the abdominal area. Before you decide to have weight loss surgery, you must commit to staying healthy for life.  This includes following up with your

## 2023-04-17 NOTE — PROGRESS NOTES
Where she works this is the worst thing subjective:     Patient ID: Elizabeth Villar is a 28 y.o. female     Patient returns today for annual exam for be well as a [employer] employee. Overall she feels well she is exercising on a regular basis and in fact is on the way to the gym. She is trying to watch her nutrition better. We reinforced the low glycemic index and the anti-inflammatory lifestyle. She believes her ADHD has been under good control with the current medications. PDMP is reviewed  Her hypertension seems to be under good control and she continues to take the same medication without incident. Denies chest pain shortness of breath palpitations headaches dizziness    ADHD  Pertinent negatives include no abdominal pain, chest pain, coughing, fatigue, fever, headaches or sore throat. Hypertension  Pertinent negatives include no chest pain, headaches, palpitations or shortness of breath. Review of Systems   Constitutional:  Negative for activity change, appetite change, fatigue and fever. HENT:  Negative for sore throat. Eyes:  Negative for visual disturbance. Respiratory:  Negative for cough, chest tightness and shortness of breath. Cardiovascular:  Negative for chest pain, palpitations and leg swelling. Gastrointestinal:  Negative for abdominal pain, constipation and diarrhea. Endocrine: Negative for cold intolerance. Genitourinary:  Negative for dysuria and urgency. Musculoskeletal:  Negative for back pain. Neurological:  Negative for dizziness, syncope and headaches. Hematological:  Does not bruise/bleed easily. Psychiatric/Behavioral:  Negative for confusion and sleep disturbance. The patient is not nervous/anxious. Objective:     Physical Exam  Constitutional:       Appearance: Normal appearance. HENT:      Head: Normocephalic.       Right Ear: External ear normal.      Left Ear: External ear normal.      Nose: Nose normal.      Mouth/Throat:      Mouth: Mucous

## 2023-05-10 DIAGNOSIS — F90.2 ATTENTION DEFICIT HYPERACTIVITY DISORDER (ADHD), COMBINED TYPE: ICD-10-CM

## 2023-05-10 RX ORDER — DEXTROAMPHETAMINE SACCHARATE, AMPHETAMINE ASPARTATE MONOHYDRATE, DEXTROAMPHETAMINE SULFATE AND AMPHETAMINE SULFATE 1.25; 1.25; 1.25; 1.25 MG/1; MG/1; MG/1; MG/1
CAPSULE, EXTENDED RELEASE ORAL
Qty: 30 CAPSULE | Refills: 0 | Status: SHIPPED | OUTPATIENT
Start: 2023-05-10 | End: 2023-06-09

## 2023-05-10 NOTE — TELEPHONE ENCOUNTER
Last visit: 4/17/23  Last Med refill: 4/4/23  Does patient have enough medication for 72 hours  Future Appointments   Date Time Provider Gianna Cornejo   8/7/2023  8:30 AM Reji Weller MD [facility]   Topic Date Due    COVID-19 Vaccine (2 - Booster for Dimitri series) 04/19/2024 (Originally 5/4/2021)    Depression Screen  04/17/2024    Cervical cancer screen  02/10/2026    DTaP/Tdap/Td vaccine (4 - Td or Tdap) 02/10/2031    Flu vaccine  Completed    Hepatitis A vaccine  Aged Out    Hib vaccine  Aged Out    Meningococcal (ACWY) vaccine  Aged Out    Pneumococcal 0-64 years Vaccine  Aged Out    Hepatitis B vaccine  Discontinued    Varicella vaccine  Discontinued    Hepatitis C screen  Discontinued    HIV screen  Discontinued       Hemoglobin A1C (%)   Date Value   08/17/2022 5.2             ( goal A1C is < 7)   No results found for: LABMICR  LDL Cholesterol (mg/dL)   Date Value   06/14/2022 118   07/08/2021 94       (goal LDL is <100)   BUN (mg/dL)   Date Value   01/11/2023 11     BP Readings from Last 3 Encounters:   04/17/23 128/84   01/11/23 126/84   12/08/22 (!) 150/90          (goal 120/80)    All Future Testing planned in [facility]  Lab Frequency Next Occurrence   PAP Smear Once 08/17/2022   Lipid Panel Once 06/26/2023               Patient Active Problem List:     Essential hypertension     Pityriasis rosea     Gastroesophageal reflux disease without esophagitis     Plantar fasciitis     Hypertriglyceridemia     BMI 28.0-28.9,adult

## 2023-06-26 DIAGNOSIS — Z30.41 ENCOUNTER FOR SURVEILLANCE OF CONTRACEPTIVE PILLS: ICD-10-CM

## 2023-06-26 DIAGNOSIS — I10 ESSENTIAL HYPERTENSION: ICD-10-CM

## 2023-06-26 DIAGNOSIS — R00.2 PALPITATIONS: ICD-10-CM

## 2023-06-26 RX ORDER — SPIRONOLACTONE 25 MG/1
25 TABLET ORAL DAILY
Qty: 30 TABLET | Refills: 5 | Status: SHIPPED | OUTPATIENT
Start: 2023-06-26

## 2023-06-27 RX ORDER — NORETHINDRONE ACETATE AND ETHINYL ESTRADIOL, ETHINYL ESTRADIOL AND FERROUS FUMARATE 1MG-10(24)
KIT ORAL
Qty: 28 TABLET | Refills: 11 | Status: SHIPPED | OUTPATIENT
Start: 2023-06-27

## 2023-07-10 ENCOUNTER — OFFICE VISIT (OUTPATIENT)
Dept: OBGYN CLINIC | Age: 33
End: 2023-07-10
Payer: COMMERCIAL

## 2023-07-10 VITALS
SYSTOLIC BLOOD PRESSURE: 138 MMHG | HEIGHT: 68 IN | BODY MASS INDEX: 27.74 KG/M2 | WEIGHT: 183 LBS | DIASTOLIC BLOOD PRESSURE: 80 MMHG

## 2023-07-10 DIAGNOSIS — N94.6 DYSMENORRHEA: ICD-10-CM

## 2023-07-10 DIAGNOSIS — Z30.41 ENCOUNTER FOR SURVEILLANCE OF CONTRACEPTIVE PILLS: Primary | ICD-10-CM

## 2023-07-10 PROCEDURE — 3075F SYST BP GE 130 - 139MM HG: CPT | Performed by: ADVANCED PRACTICE MIDWIFE

## 2023-07-10 PROCEDURE — 99213 OFFICE O/P EST LOW 20 MIN: CPT | Performed by: ADVANCED PRACTICE MIDWIFE

## 2023-07-10 PROCEDURE — 3079F DIAST BP 80-89 MM HG: CPT | Performed by: ADVANCED PRACTICE MIDWIFE

## 2023-07-10 RX ORDER — NORETHINDRONE ACETATE AND ETHINYL ESTRADIOL 1MG-20(21)
1 KIT ORAL DAILY
Qty: 3 PACKET | Refills: 3 | Status: SHIPPED | OUTPATIENT
Start: 2023-07-10 | End: 2023-07-11

## 2023-07-10 RX ORDER — NORETHINDRONE ACETATE AND ETHINYL ESTRADIOL 1MG-20(21)
1 KIT ORAL DAILY
Qty: 1 PACKET | Refills: 11 | Status: SHIPPED | OUTPATIENT
Start: 2023-07-10 | End: 2023-07-10 | Stop reason: DRUGHIGH

## 2023-07-11 ENCOUNTER — OFFICE VISIT (OUTPATIENT)
Dept: PRIMARY CARE CLINIC | Age: 33
End: 2023-07-11
Payer: COMMERCIAL

## 2023-07-11 VITALS
SYSTOLIC BLOOD PRESSURE: 124 MMHG | BODY MASS INDEX: 27.43 KG/M2 | DIASTOLIC BLOOD PRESSURE: 80 MMHG | WEIGHT: 181 LBS | HEIGHT: 68 IN | HEART RATE: 74 BPM | OXYGEN SATURATION: 97 %

## 2023-07-11 DIAGNOSIS — R00.2 PALPITATIONS: Primary | ICD-10-CM

## 2023-07-11 DIAGNOSIS — I10 ESSENTIAL HYPERTENSION: ICD-10-CM

## 2023-07-11 DIAGNOSIS — F90.2 ATTENTION DEFICIT HYPERACTIVITY DISORDER (ADHD), COMBINED TYPE: ICD-10-CM

## 2023-07-11 PROCEDURE — 3078F DIAST BP <80 MM HG: CPT | Performed by: FAMILY MEDICINE

## 2023-07-11 PROCEDURE — 99214 OFFICE O/P EST MOD 30 MIN: CPT | Performed by: FAMILY MEDICINE

## 2023-07-11 PROCEDURE — 3074F SYST BP LT 130 MM HG: CPT | Performed by: FAMILY MEDICINE

## 2023-07-11 RX ORDER — NORETHINDRONE ACETATE AND ETHINYL ESTRADIOL 1MG-20(21)
1 KIT ORAL DAILY
COMMUNITY

## 2023-07-11 RX ORDER — DEXTROAMPHETAMINE SACCHARATE, AMPHETAMINE ASPARTATE MONOHYDRATE, DEXTROAMPHETAMINE SULFATE AND AMPHETAMINE SULFATE 1.25; 1.25; 1.25; 1.25 MG/1; MG/1; MG/1; MG/1
5 CAPSULE, EXTENDED RELEASE ORAL DAILY
Qty: 30 CAPSULE | Refills: 0 | Status: SHIPPED | OUTPATIENT
Start: 2023-09-09 | End: 2023-10-09

## 2023-07-11 RX ORDER — DEXTROAMPHETAMINE SACCHARATE, AMPHETAMINE ASPARTATE MONOHYDRATE, DEXTROAMPHETAMINE SULFATE AND AMPHETAMINE SULFATE 1.25; 1.25; 1.25; 1.25 MG/1; MG/1; MG/1; MG/1
5 CAPSULE, EXTENDED RELEASE ORAL DAILY
Qty: 30 CAPSULE | Refills: 0 | Status: SHIPPED | OUTPATIENT
Start: 2023-08-10 | End: 2023-07-14

## 2023-07-11 RX ORDER — DEXTROAMPHETAMINE SACCHARATE, AMPHETAMINE ASPARTATE MONOHYDRATE, DEXTROAMPHETAMINE SULFATE AND AMPHETAMINE SULFATE 1.25; 1.25; 1.25; 1.25 MG/1; MG/1; MG/1; MG/1
5 CAPSULE, EXTENDED RELEASE ORAL DAILY
Qty: 30 CAPSULE | Refills: 0 | Status: SHIPPED | OUTPATIENT
Start: 2023-07-11 | End: 2023-07-14

## 2023-07-11 ASSESSMENT — PATIENT HEALTH QUESTIONNAIRE - PHQ9
1. LITTLE INTEREST OR PLEASURE IN DOING THINGS: 0
SUM OF ALL RESPONSES TO PHQ QUESTIONS 1-9: 0
SUM OF ALL RESPONSES TO PHQ QUESTIONS 1-9: 0
2. FEELING DOWN, DEPRESSED OR HOPELESS: 0
SUM OF ALL RESPONSES TO PHQ9 QUESTIONS 1 & 2: 0
SUM OF ALL RESPONSES TO PHQ QUESTIONS 1-9: 0
SUM OF ALL RESPONSES TO PHQ QUESTIONS 1-9: 0

## 2023-07-11 NOTE — PROGRESS NOTES
Conjunctiva/sclera: Conjunctivae normal.   Cardiovascular:      Rate and Rhythm: Normal rate and regular rhythm. Pulses: Normal pulses. Heart sounds: Normal heart sounds. No murmur heard. Pulmonary:      Effort: Pulmonary effort is normal.      Breath sounds: Normal breath sounds. Musculoskeletal:         General: Normal range of motion. Cervical back: Neck supple. Right lower leg: No edema. Left lower leg: No edema. Lymphadenopathy:      Cervical: No cervical adenopathy. Skin:     General: Skin is warm and dry. Capillary Refill: Capillary refill takes less than 2 seconds. Neurological:      General: No focal deficit present. Mental Status: She is alert and oriented to person, place, and time. Psychiatric:         Mood and Affect: Mood normal.         Behavior: Behavior normal.       Assessment/Plan:     1. Palpitations    2. Essential hypertension    3. Attention deficit hyperactivity disorder (ADHD), combined type          Emily Beckett was seen today for hypertension and adhd. Diagnoses and all orders for this visit:    Palpitations  -     T4, Free; Future  -     TSH; Future  -     Cardiac event monitor; Future  -     ECHO 2D WO Color Doppler Complete; Future    Essential hypertension  Good control with labetalol and hypertension avoid added stimulants and excess salt    Attention deficit hyperactivity disorder (ADHD), combined type  -     amphetamine-dextroamphetamine (ADDERALL XR) 5 MG extended release capsule; Take 1 capsule by mouth daily for 30 days. Max Daily Amount: 5 mg  -     amphetamine-dextroamphetamine (ADDERALL XR) 5 MG extended release capsule; Take 1 capsule by mouth daily for 30 days. Max Daily Amount: 5 mg  -     amphetamine-dextroamphetamine (ADDERALL XR) 5 MG extended release capsule; Take 1 capsule by mouth daily for 30 days. Max Daily Amount: 5 mg    Return to office to discuss results. Suspect that this all represents dysautonomia.   If symptoms get

## 2023-07-14 ENCOUNTER — OFFICE VISIT (OUTPATIENT)
Dept: PRIMARY CARE CLINIC | Age: 33
End: 2023-07-14
Payer: COMMERCIAL

## 2023-07-14 VITALS
BODY MASS INDEX: 27.58 KG/M2 | SYSTOLIC BLOOD PRESSURE: 118 MMHG | TEMPERATURE: 99 F | DIASTOLIC BLOOD PRESSURE: 90 MMHG | OXYGEN SATURATION: 99 % | HEART RATE: 62 BPM | WEIGHT: 182 LBS | RESPIRATION RATE: 20 BRPM | HEIGHT: 68 IN

## 2023-07-14 DIAGNOSIS — J01.90 ACUTE BACTERIAL SINUSITIS: Primary | ICD-10-CM

## 2023-07-14 DIAGNOSIS — B37.9 ANTIBIOTIC-INDUCED YEAST INFECTION: ICD-10-CM

## 2023-07-14 DIAGNOSIS — R05.1 ACUTE COUGH: ICD-10-CM

## 2023-07-14 DIAGNOSIS — T36.95XA ANTIBIOTIC-INDUCED YEAST INFECTION: ICD-10-CM

## 2023-07-14 DIAGNOSIS — H69.83 EUSTACHIAN TUBE DYSFUNCTION, BILATERAL: ICD-10-CM

## 2023-07-14 DIAGNOSIS — B96.89 ACUTE BACTERIAL SINUSITIS: Primary | ICD-10-CM

## 2023-07-14 PROCEDURE — 99213 OFFICE O/P EST LOW 20 MIN: CPT

## 2023-07-14 PROCEDURE — 3080F DIAST BP >= 90 MM HG: CPT

## 2023-07-14 PROCEDURE — 3074F SYST BP LT 130 MM HG: CPT

## 2023-07-14 RX ORDER — FLUCONAZOLE 150 MG/1
150 TABLET ORAL ONCE
Qty: 1 TABLET | Refills: 0 | Status: SHIPPED | OUTPATIENT
Start: 2023-07-14 | End: 2023-07-14

## 2023-07-14 RX ORDER — AMOXICILLIN AND CLAVULANATE POTASSIUM 875; 125 MG/1; MG/1
1 TABLET, FILM COATED ORAL 2 TIMES DAILY
Qty: 20 TABLET | Refills: 0 | Status: SHIPPED | OUTPATIENT
Start: 2023-07-14 | End: 2023-07-24

## 2023-07-14 RX ORDER — FLUTICASONE PROPIONATE 50 MCG
2 SPRAY, SUSPENSION (ML) NASAL DAILY
Qty: 16 G | Refills: 0 | Status: SHIPPED | OUTPATIENT
Start: 2023-07-14

## 2023-07-14 ASSESSMENT — ENCOUNTER SYMPTOMS
DIARRHEA: 0
SHORTNESS OF BREATH: 0
ABDOMINAL PAIN: 0
COUGH: 1
SORE THROAT: 0
VOICE CHANGE: 1
COUGH: 0
BACK PAIN: 0
RHINORRHEA: 1
SHORTNESS OF BREATH: 0
CHEST TIGHTNESS: 0
SINUS PRESSURE: 1
CONSTIPATION: 0
EYE DISCHARGE: 0
SORE THROAT: 1

## 2023-07-14 NOTE — PATIENT INSTRUCTIONS
Finish the entire course of antibiotic treatment. Continue with supportive treatment measures. Push hydration. Use Tylenol or Motrin as needed for pain/discomfort. Recommend daily use of Flonase for 1-2 weeks. Follow-up if worsening or no improvement.

## 2023-07-23 ENCOUNTER — PATIENT MESSAGE (OUTPATIENT)
Dept: PRIMARY CARE CLINIC | Age: 33
End: 2023-07-23

## 2023-07-31 ENCOUNTER — OFFICE VISIT (OUTPATIENT)
Dept: PRIMARY CARE CLINIC | Age: 33
End: 2023-07-31
Payer: COMMERCIAL

## 2023-07-31 VITALS
WEIGHT: 178 LBS | BODY MASS INDEX: 26.98 KG/M2 | OXYGEN SATURATION: 98 % | SYSTOLIC BLOOD PRESSURE: 118 MMHG | DIASTOLIC BLOOD PRESSURE: 80 MMHG | HEART RATE: 78 BPM | TEMPERATURE: 97.3 F | HEIGHT: 68 IN

## 2023-07-31 DIAGNOSIS — J01.40 ACUTE NON-RECURRENT PANSINUSITIS: Primary | ICD-10-CM

## 2023-07-31 PROCEDURE — 3074F SYST BP LT 130 MM HG: CPT | Performed by: NURSE PRACTITIONER

## 2023-07-31 PROCEDURE — 3079F DIAST BP 80-89 MM HG: CPT | Performed by: NURSE PRACTITIONER

## 2023-07-31 PROCEDURE — 99213 OFFICE O/P EST LOW 20 MIN: CPT | Performed by: NURSE PRACTITIONER

## 2023-07-31 RX ORDER — DOXYCYCLINE HYCLATE 100 MG
100 TABLET ORAL 2 TIMES DAILY
Qty: 20 TABLET | Refills: 0 | Status: SHIPPED | OUTPATIENT
Start: 2023-07-31 | End: 2023-08-10

## 2023-07-31 RX ORDER — PREDNISONE 20 MG/1
40 TABLET ORAL DAILY
Qty: 10 TABLET | Refills: 0 | Status: SHIPPED | OUTPATIENT
Start: 2023-07-31 | End: 2023-08-05

## 2023-07-31 ASSESSMENT — ENCOUNTER SYMPTOMS
SINUS PRESSURE: 1
SORE THROAT: 1
RHINORRHEA: 1
CONSTIPATION: 0
COUGH: 1
EYE ITCHING: 0
SINUS PAIN: 1
ABDOMINAL PAIN: 0
EYE REDNESS: 0
SHORTNESS OF BREATH: 0
CHEST TIGHTNESS: 0
WHEEZING: 0
NAUSEA: 0
DIARRHEA: 0
VOMITING: 0

## 2023-08-15 DIAGNOSIS — K21.9 GASTROESOPHAGEAL REFLUX DISEASE WITHOUT ESOPHAGITIS: ICD-10-CM

## 2023-08-15 DIAGNOSIS — I10 ESSENTIAL HYPERTENSION: ICD-10-CM

## 2023-08-15 RX ORDER — HYDROCHLOROTHIAZIDE 12.5 MG/1
TABLET ORAL
Qty: 90 TABLET | Refills: 1 | Status: SHIPPED | OUTPATIENT
Start: 2023-08-15

## 2023-08-16 ENCOUNTER — HOSPITAL ENCOUNTER (OUTPATIENT)
Age: 33
Discharge: HOME | End: 2023-08-18
Attending: FAMILY MEDICINE
Payer: COMMERCIAL

## 2023-08-16 ENCOUNTER — HOSPITAL ENCOUNTER (OUTPATIENT)
Age: 33
Discharge: HOME | End: 2023-08-16
Payer: COMMERCIAL

## 2023-08-16 DIAGNOSIS — R00.2 PALPITATIONS: ICD-10-CM

## 2023-08-16 DIAGNOSIS — I10 ESSENTIAL HYPERTENSION, MALIGNANT: ICD-10-CM

## 2023-08-16 DIAGNOSIS — I49.9 IRREGULAR HEART RHYTHM: ICD-10-CM

## 2023-08-16 PROCEDURE — 84439 ASSAY OF FREE THYROXINE: CPT

## 2023-08-16 PROCEDURE — 84443 ASSAY THYROID STIM HORMONE: CPT

## 2023-08-16 PROCEDURE — 36415 COLL VENOUS BLD VENIPUNCTURE: CPT

## 2023-08-16 PROCEDURE — 93225 XTRNL ECG REC<48 HRS REC: CPT

## 2023-08-17 LAB
T4 FREE SERPL-MCNC: 1.2 NG/DL (ref 0.9–1.7)
TSH SERPL DL<=0.05 MIU/L-ACNC: 1.01 UIU/ML (ref 0.3–5)

## 2023-09-20 ENCOUNTER — HOSPITAL ENCOUNTER (OUTPATIENT)
Age: 33
Discharge: HOME | End: 2023-09-22
Attending: FAMILY MEDICINE
Payer: COMMERCIAL

## 2023-09-20 VITALS
HEIGHT: 68 IN | BODY MASS INDEX: 26.98 KG/M2 | DIASTOLIC BLOOD PRESSURE: 80 MMHG | WEIGHT: 178 LBS | SYSTOLIC BLOOD PRESSURE: 118 MMHG

## 2023-09-20 DIAGNOSIS — R00.2 PALPITATIONS: ICD-10-CM

## 2023-09-20 LAB
ECHO AO ASC DIAM: 2.7 CM
ECHO AO ASCENDING AORTA INDEX: 1.38 CM/M2
ECHO AO ROOT DIAM: 2.8 CM
ECHO AO ROOT INDEX: 1.44 CM/M2
ECHO AV AREA PEAK VELOCITY: 2.7 CM2
ECHO AV AREA VTI: 2.7 CM2
ECHO AV AREA/BSA PEAK VELOCITY: 1.4 CM2/M2
ECHO AV AREA/BSA VTI: 1.4 CM2/M2
ECHO AV MEAN GRADIENT: 5 MMHG
ECHO AV MEAN VELOCITY: 1 M/S
ECHO AV PEAK GRADIENT: 14 MMHG
ECHO AV PEAK VELOCITY: 1.9 M/S
ECHO AV VELOCITY RATIO: 0.84
ECHO AV VTI: 37.3 CM
ECHO BSA: 1.97 M2
ECHO EST RA PRESSURE: 5 MMHG
ECHO IVC PROX: 1.7 CM
ECHO LA AREA 2C: 18.9 CM2
ECHO LA AREA 4C: 23.1 CM2
ECHO LA DIAMETER INDEX: 1.9 CM/M2
ECHO LA DIAMETER: 3.7 CM
ECHO LA MAJOR AXIS: 6.2 CM
ECHO LA MINOR AXIS: 5.1 CM
ECHO LA TO AORTIC ROOT RATIO: 1.32
ECHO LA VOL 2C: 56 ML (ref 22–52)
ECHO LA VOL 4C: 66 ML (ref 22–52)
ECHO LA VOL BP: 68 ML (ref 22–52)
ECHO LA VOL/BSA BIPLANE: 35 ML/M2 (ref 16–34)
ECHO LA VOLUME INDEX A2C: 29 ML/M2 (ref 16–34)
ECHO LA VOLUME INDEX A4C: 34 ML/M2 (ref 16–34)
ECHO LV E' LATERAL VELOCITY: 14 CM/S
ECHO LV E' SEPTAL VELOCITY: 11 CM/S
ECHO LV EDV A2C: 100 ML
ECHO LV EDV A4C: 96 ML
ECHO LV EDV INDEX A4C: 49 ML/M2
ECHO LV EDV NDEX A2C: 51 ML/M2
ECHO LV EJECTION FRACTION A2C: 71 %
ECHO LV EJECTION FRACTION A4C: 60 %
ECHO LV EJECTION FRACTION BIPLANE: 66 % (ref 55–100)
ECHO LV ESV A2C: 29 ML
ECHO LV ESV A4C: 39 ML
ECHO LV ESV INDEX A2C: 15 ML/M2
ECHO LV ESV INDEX A4C: 20 ML/M2
ECHO LV FRACTIONAL SHORTENING: 38 % (ref 28–44)
ECHO LV INTERNAL DIMENSION DIASTOLE INDEX: 2.31 CM/M2
ECHO LV INTERNAL DIMENSION DIASTOLIC: 4.5 CM (ref 3.9–5.3)
ECHO LV INTERNAL DIMENSION SYSTOLIC INDEX: 1.44 CM/M2
ECHO LV INTERNAL DIMENSION SYSTOLIC: 2.8 CM
ECHO LV IVSD: 0.9 CM (ref 0.6–0.9)
ECHO LV MASS 2D: 153.3 G (ref 67–162)
ECHO LV MASS INDEX 2D: 78.6 G/M2 (ref 43–95)
ECHO LV POSTERIOR WALL DIASTOLIC: 1.1 CM (ref 0.6–0.9)
ECHO LV RELATIVE WALL THICKNESS RATIO: 0.49
ECHO LVOT AREA: 3.1 CM2
ECHO LVOT AV VTI INDEX: 0.87
ECHO LVOT DIAM: 2 CM
ECHO LVOT MEAN GRADIENT: 4 MMHG
ECHO LVOT PEAK GRADIENT: 10 MMHG
ECHO LVOT PEAK VELOCITY: 1.6 M/S
ECHO LVOT STROKE VOLUME INDEX: 52.3 ML/M2
ECHO LVOT SV: 102.1 ML
ECHO LVOT VTI: 32.5 CM
ECHO MV A VELOCITY: 0.76 M/S
ECHO MV AREA VTI: 3 CM2
ECHO MV E DECELERATION TIME (DT): 187 MS
ECHO MV E VELOCITY: 0.84 M/S
ECHO MV E/A RATIO: 1.11
ECHO MV E/E' LATERAL: 6
ECHO MV E/E' RATIO (AVERAGED): 6.82
ECHO MV E/E' SEPTAL: 7.64
ECHO MV LVOT VTI INDEX: 1.04
ECHO MV MAX VELOCITY: 1 M/S
ECHO MV MEAN GRADIENT: 2 MMHG
ECHO MV MEAN VELOCITY: 0.7 M/S
ECHO MV PEAK GRADIENT: 4 MMHG
ECHO MV VTI: 33.8 CM
ECHO PV MAX VELOCITY: 1.1 M/S
ECHO PV PEAK GRADIENT: 5 MMHG
ECHO PVEIN A DURATION: 153 MS
ECHO PVEIN A VELOCITY: 0.4 M/S
ECHO PVEIN PEAK D VELOCITY: 0.4 M/S
ECHO PVEIN PEAK S VELOCITY: 0.7 M/S
ECHO PVEIN S/D RATIO: 1.8 NO UNITS
ECHO RA AREA 4C: 14.7 CM2
ECHO RIGHT VENTRICULAR SYSTOLIC PRESSURE (RVSP): 32 MMHG
ECHO RV FREE WALL PEAK S': 14 CM/S
ECHO RV TAPSE: 3 CM (ref 1.7–?)
ECHO TV PEAK GRADIENT: 3 MMHG
ECHO TV REGURGITANT MAX VELOCITY: 2.58 M/S
ECHO TV REGURGITANT PEAK GRADIENT: 32 MMHG

## 2023-09-20 PROCEDURE — 93306 TTE W/DOPPLER COMPLETE: CPT

## 2023-09-20 PROCEDURE — 93306 TTE W/DOPPLER COMPLETE: CPT | Performed by: INTERNAL MEDICINE

## 2023-09-25 NOTE — PROGRESS NOTES
Date of Visit: 9/28/2023    SUBJECTIVE:  Chief Complaint   Patient presents with    Annual Exam       HPI  Rebecca Traylor arrives for annual Well Woman exam.  Patient denies concerns today. She relates she is happy with PRESTON, which she is continuously cycling and doing well. She desires to continue    Her No LMP recorded. (Menstrual status: Other - See Notes). Rachna Plants Her bowel habits are regular. She denies any bloating. She denies dysuria. She denies urinary leaking. She denies vaginal discharge. She is sexually active with long-term partner  She uses OCP for contraception and is open to pregnancy if it were to occur    Depression/Anxiety screen:      9/28/2023    10:44 AM 7/11/2023     4:22 PM 4/17/2023     7:38 AM 1/11/2023    11:21 AM 12/8/2022     8:07 AM 9/15/2022     3:16 PM 8/17/2022     3:18 PM   PHQ Scores   PHQ2 Score 0 0 0 0 0 0 0   PHQ9 Score 0 0 0 0 0 0 0           9/28/2023    10:00 AM 2/10/2021    10:00 AM   RICHARD 7 SCORE   RICHARD-7 Total Score 1    RICHARD-7 Total Score  2       Review of Systems   Constitutional: Negative. HENT: Negative. Respiratory: Negative. Cardiovascular: Negative. Gastrointestinal: Negative. Genitourinary:         Menstrual cramping   Musculoskeletal: Negative. Skin: Negative. Neurological: Negative. Hematological: Negative. Psychiatric/Behavioral: Negative.          PREVENTIVE HEALTH SCREENING:   Date of last pap:  8/2022 normal              HPV typing/date: ?    History of abnormal pap: No    Date of last mammogram: NA  Date of last DEXA scan: NA  Date of last colonoscopy: NA    History of Gestational Diabetes: No  History of Pre-Eclampsia: No    Preventive screening through PCP: Yes    Family history of Breast, Ovarian, Colon or Uterine Cancer:  YES     See updated review in Media     Genetic counseling:  Updated and will consider      GYNECOLOGIC HISTORY:  Menarche: Teens    STD history: Chlamydia     Contraception: PRESTON      Physical Exam:

## 2023-09-28 ENCOUNTER — HOSPITAL ENCOUNTER (OUTPATIENT)
Age: 33
Setting detail: SPECIMEN
Discharge: HOME | End: 2023-09-28

## 2023-09-28 ENCOUNTER — OFFICE VISIT (OUTPATIENT)
Dept: OBGYN CLINIC | Age: 33
End: 2023-09-28
Payer: COMMERCIAL

## 2023-09-28 VITALS
BODY MASS INDEX: 27.43 KG/M2 | SYSTOLIC BLOOD PRESSURE: 124 MMHG | HEIGHT: 68 IN | DIASTOLIC BLOOD PRESSURE: 76 MMHG | WEIGHT: 181 LBS

## 2023-09-28 DIAGNOSIS — N94.6 DYSMENORRHEA: ICD-10-CM

## 2023-09-28 DIAGNOSIS — I10 ESSENTIAL HYPERTENSION: Chronic | ICD-10-CM

## 2023-09-28 DIAGNOSIS — Z01.419 WOMEN'S ANNUAL ROUTINE GYNECOLOGICAL EXAMINATION: Primary | ICD-10-CM

## 2023-09-28 PROCEDURE — 3074F SYST BP LT 130 MM HG: CPT | Performed by: ADVANCED PRACTICE MIDWIFE

## 2023-09-28 PROCEDURE — 3078F DIAST BP <80 MM HG: CPT | Performed by: ADVANCED PRACTICE MIDWIFE

## 2023-09-28 PROCEDURE — 99395 PREV VISIT EST AGE 18-39: CPT | Performed by: ADVANCED PRACTICE MIDWIFE

## 2023-09-28 RX ORDER — NORETHINDRONE ACETATE AND ETHINYL ESTRADIOL 1MG-20(21)
1 KIT ORAL DAILY
Qty: 3 PACKET | Refills: 3 | Status: SHIPPED | OUTPATIENT
Start: 2023-09-28

## 2023-09-28 ASSESSMENT — ENCOUNTER SYMPTOMS
GASTROINTESTINAL NEGATIVE: 1
RESPIRATORY NEGATIVE: 1

## 2023-09-28 ASSESSMENT — PATIENT HEALTH QUESTIONNAIRE - PHQ9
SUM OF ALL RESPONSES TO PHQ QUESTIONS 1-9: 0
SUM OF ALL RESPONSES TO PHQ9 QUESTIONS 1 & 2: 0
1. LITTLE INTEREST OR PLEASURE IN DOING THINGS: 0
SUM OF ALL RESPONSES TO PHQ QUESTIONS 1-9: 0
SUM OF ALL RESPONSES TO PHQ QUESTIONS 1-9: 0
2. FEELING DOWN, DEPRESSED OR HOPELESS: 0
SUM OF ALL RESPONSES TO PHQ QUESTIONS 1-9: 0

## 2023-09-28 ASSESSMENT — ANXIETY QUESTIONNAIRES
3. WORRYING TOO MUCH ABOUT DIFFERENT THINGS: 0
2. NOT BEING ABLE TO STOP OR CONTROL WORRYING: 0
1. FEELING NERVOUS, ANXIOUS, OR ON EDGE: 0
7. FEELING AFRAID AS IF SOMETHING AWFUL MIGHT HAPPEN: 0
5. BEING SO RESTLESS THAT IT IS HARD TO SIT STILL: 0
GAD7 TOTAL SCORE: 1
6. BECOMING EASILY ANNOYED OR IRRITABLE: 1
4. TROUBLE RELAXING: 0

## 2023-09-28 ASSESSMENT — SOCIAL DETERMINANTS OF HEALTH (SDOH)
WITHIN THE LAST YEAR, HAVE YOU BEEN HUMILIATED OR EMOTIONALLY ABUSED IN OTHER WAYS BY YOUR PARTNER OR EX-PARTNER?: NO
WITHIN THE LAST YEAR, HAVE YOU BEEN KICKED, HIT, SLAPPED, OR OTHERWISE PHYSICALLY HURT BY YOUR PARTNER OR EX-PARTNER?: NO
WITHIN THE LAST YEAR, HAVE YOU BEEN AFRAID OF YOUR PARTNER OR EX-PARTNER?: NO
WITHIN THE LAST YEAR, HAVE TO BEEN RAPED OR FORCED TO HAVE ANY KIND OF SEXUAL ACTIVITY BY YOUR PARTNER OR EX-PARTNER?: NO

## 2023-09-28 NOTE — PROGRESS NOTES
Patient is here for her annual exam  Last pap was 8/17/22 wnl    Patient would like a refill of her birth control     Chaperone for Intimate Exam  Chaperone was offered as part of the rooming process. Patient declined and agrees to continue with exam without a chaperone.   Chaperone: n/a       GAD7-1  PHQ2- 0

## 2023-09-30 LAB
HPV I/H RISK 4 DNA CVX QL NAA+PROBE: NOT DETECTED
HPV SAMPLE: NORMAL
HPV, INTERPRETATION: NORMAL
HPV16 DNA CVX QL NAA+PROBE: NOT DETECTED
HPV18 DNA CVX QL NAA+PROBE: NOT DETECTED
SPECIMEN DESCRIPTION: NORMAL

## 2023-10-05 LAB — CYTOLOGY REPORT: NORMAL

## 2023-10-18 ENCOUNTER — OFFICE VISIT (OUTPATIENT)
Dept: PRIMARY CARE CLINIC | Age: 33
End: 2023-10-18
Payer: COMMERCIAL

## 2023-10-18 VITALS
HEIGHT: 68 IN | WEIGHT: 182.6 LBS | DIASTOLIC BLOOD PRESSURE: 76 MMHG | SYSTOLIC BLOOD PRESSURE: 114 MMHG | OXYGEN SATURATION: 97 % | BODY MASS INDEX: 27.68 KG/M2 | HEART RATE: 76 BPM

## 2023-10-18 DIAGNOSIS — R00.2 PALPITATIONS: ICD-10-CM

## 2023-10-18 DIAGNOSIS — K21.9 GASTROESOPHAGEAL REFLUX DISEASE WITHOUT ESOPHAGITIS: ICD-10-CM

## 2023-10-18 DIAGNOSIS — F90.2 ATTENTION DEFICIT HYPERACTIVITY DISORDER (ADHD), COMBINED TYPE: Primary | ICD-10-CM

## 2023-10-18 DIAGNOSIS — M72.2 PLANTAR FASCIITIS OF LEFT FOOT: ICD-10-CM

## 2023-10-18 DIAGNOSIS — H65.93 BILATERAL SEROUS OTITIS MEDIA, UNSPECIFIED CHRONICITY: ICD-10-CM

## 2023-10-18 DIAGNOSIS — I10 ESSENTIAL HYPERTENSION: ICD-10-CM

## 2023-10-18 PROCEDURE — 3074F SYST BP LT 130 MM HG: CPT | Performed by: FAMILY MEDICINE

## 2023-10-18 PROCEDURE — 3078F DIAST BP <80 MM HG: CPT | Performed by: FAMILY MEDICINE

## 2023-10-18 PROCEDURE — 99214 OFFICE O/P EST MOD 30 MIN: CPT | Performed by: FAMILY MEDICINE

## 2023-10-18 RX ORDER — LABETALOL 200 MG/1
200 TABLET, FILM COATED ORAL DAILY
Qty: 90 TABLET | Refills: 1 | Status: SHIPPED | OUTPATIENT
Start: 2023-10-18 | End: 2024-04-15

## 2023-10-18 RX ORDER — HYDROCHLOROTHIAZIDE 12.5 MG/1
TABLET ORAL
Qty: 90 TABLET | Refills: 1 | Status: SHIPPED | OUTPATIENT
Start: 2023-10-18

## 2023-10-18 ASSESSMENT — PATIENT HEALTH QUESTIONNAIRE - PHQ9
SUM OF ALL RESPONSES TO PHQ QUESTIONS 1-9: 0
SUM OF ALL RESPONSES TO PHQ9 QUESTIONS 1 & 2: 0
1. LITTLE INTEREST OR PLEASURE IN DOING THINGS: 0
SUM OF ALL RESPONSES TO PHQ QUESTIONS 1-9: 0
2. FEELING DOWN, DEPRESSED OR HOPELESS: 0

## 2023-10-22 ASSESSMENT — ENCOUNTER SYMPTOMS
SHORTNESS OF BREATH: 0
ABDOMINAL PAIN: 0
WHEEZING: 0
SORE THROAT: 0
COUGH: 0

## 2023-11-14 DIAGNOSIS — K21.9 GASTROESOPHAGEAL REFLUX DISEASE WITHOUT ESOPHAGITIS: ICD-10-CM

## 2023-12-04 DIAGNOSIS — F90.2 ATTENTION DEFICIT HYPERACTIVITY DISORDER (ADHD), COMBINED TYPE: ICD-10-CM

## 2023-12-04 NOTE — TELEPHONE ENCOUNTER
Pao Quintana is calling to request a refill on the following medication(s):    Last Visit Date (If Applicable):  04/65/5035    Next Visit Date:    1/31/2024    Medication Request:  Requested Prescriptions     Pending Prescriptions Disp Refills    amphetamine-dextroamphetamine (ADDERALL XR) 5 MG extended release capsule [Pharmacy Med Name: AMPHETAMINE-DEXTROAMPHET ER 5 CP24] 30 capsule 0     Sig: TAKE 1 CAPSULE BY MOUTH DAILY FOR 30 DAYS.  MAX DAILY AMOUNT: 5MG

## 2023-12-05 RX ORDER — DEXTROAMPHETAMINE SACCHARATE, AMPHETAMINE ASPARTATE MONOHYDRATE, DEXTROAMPHETAMINE SULFATE AND AMPHETAMINE SULFATE 1.25; 1.25; 1.25; 1.25 MG/1; MG/1; MG/1; MG/1
CAPSULE, EXTENDED RELEASE ORAL
Qty: 30 CAPSULE | Refills: 0 | Status: SHIPPED | OUTPATIENT
Start: 2023-12-05 | End: 2024-01-04

## 2024-01-30 DIAGNOSIS — I10 ESSENTIAL HYPERTENSION: ICD-10-CM

## 2024-01-30 DIAGNOSIS — R00.2 PALPITATIONS: ICD-10-CM

## 2024-01-30 RX ORDER — SPIRONOLACTONE 25 MG/1
25 TABLET ORAL DAILY
Qty: 30 TABLET | Refills: 5 | OUTPATIENT
Start: 2024-01-30

## 2024-01-31 ENCOUNTER — OFFICE VISIT (OUTPATIENT)
Dept: PRIMARY CARE CLINIC | Age: 34
End: 2024-01-31
Payer: COMMERCIAL

## 2024-01-31 VITALS
HEART RATE: 74 BPM | WEIGHT: 187.8 LBS | BODY MASS INDEX: 28.46 KG/M2 | HEIGHT: 68 IN | DIASTOLIC BLOOD PRESSURE: 82 MMHG | OXYGEN SATURATION: 97 % | SYSTOLIC BLOOD PRESSURE: 128 MMHG

## 2024-01-31 DIAGNOSIS — I05.9 MITRAL VALVE DISEASE: ICD-10-CM

## 2024-01-31 DIAGNOSIS — K21.9 GASTROESOPHAGEAL REFLUX DISEASE WITHOUT ESOPHAGITIS: ICD-10-CM

## 2024-01-31 DIAGNOSIS — R00.2 PALPITATIONS: ICD-10-CM

## 2024-01-31 DIAGNOSIS — F90.2 ATTENTION DEFICIT HYPERACTIVITY DISORDER (ADHD), COMBINED TYPE: Primary | ICD-10-CM

## 2024-01-31 DIAGNOSIS — I10 ESSENTIAL HYPERTENSION: ICD-10-CM

## 2024-01-31 PROCEDURE — 3079F DIAST BP 80-89 MM HG: CPT | Performed by: FAMILY MEDICINE

## 2024-01-31 PROCEDURE — 99214 OFFICE O/P EST MOD 30 MIN: CPT | Performed by: FAMILY MEDICINE

## 2024-01-31 PROCEDURE — 3074F SYST BP LT 130 MM HG: CPT | Performed by: FAMILY MEDICINE

## 2024-01-31 RX ORDER — DEXTROAMPHETAMINE SACCHARATE, AMPHETAMINE ASPARTATE MONOHYDRATE, DEXTROAMPHETAMINE SULFATE AND AMPHETAMINE SULFATE 1.25; 1.25; 1.25; 1.25 MG/1; MG/1; MG/1; MG/1
5 CAPSULE, EXTENDED RELEASE ORAL DAILY
Qty: 30 CAPSULE | Refills: 0 | Status: SHIPPED | OUTPATIENT
Start: 2024-01-31 | End: 2024-03-01

## 2024-01-31 RX ORDER — LABETALOL 200 MG/1
200 TABLET, FILM COATED ORAL DAILY
Qty: 90 TABLET | Refills: 1 | Status: SHIPPED | OUTPATIENT
Start: 2024-01-31 | End: 2024-07-29

## 2024-01-31 RX ORDER — DEXTROAMPHETAMINE SACCHARATE, AMPHETAMINE ASPARTATE MONOHYDRATE, DEXTROAMPHETAMINE SULFATE AND AMPHETAMINE SULFATE 1.25; 1.25; 1.25; 1.25 MG/1; MG/1; MG/1; MG/1
5 CAPSULE, EXTENDED RELEASE ORAL DAILY
Qty: 30 CAPSULE | Refills: 0 | Status: SHIPPED | OUTPATIENT
Start: 2024-03-01 | End: 2024-03-31

## 2024-01-31 RX ORDER — SPIRONOLACTONE 25 MG/1
25 TABLET ORAL DAILY
Qty: 90 TABLET | Refills: 1 | Status: SHIPPED | OUTPATIENT
Start: 2024-01-31 | End: 2024-07-29

## 2024-01-31 RX ORDER — HYDROCHLOROTHIAZIDE 12.5 MG/1
TABLET ORAL
Qty: 90 TABLET | Refills: 1 | Status: SHIPPED | OUTPATIENT
Start: 2024-01-31

## 2024-01-31 RX ORDER — DEXTROAMPHETAMINE SACCHARATE, AMPHETAMINE ASPARTATE MONOHYDRATE, DEXTROAMPHETAMINE SULFATE AND AMPHETAMINE SULFATE 1.25; 1.25; 1.25; 1.25 MG/1; MG/1; MG/1; MG/1
5 CAPSULE, EXTENDED RELEASE ORAL DAILY
Qty: 30 CAPSULE | Refills: 0 | Status: SHIPPED | OUTPATIENT
Start: 2024-03-31 | End: 2024-04-30

## 2024-01-31 ASSESSMENT — PATIENT HEALTH QUESTIONNAIRE - PHQ9
SUM OF ALL RESPONSES TO PHQ QUESTIONS 1-9: 0
SUM OF ALL RESPONSES TO PHQ QUESTIONS 1-9: 0
2. FEELING DOWN, DEPRESSED OR HOPELESS: 0
1. LITTLE INTEREST OR PLEASURE IN DOING THINGS: 0
SUM OF ALL RESPONSES TO PHQ9 QUESTIONS 1 & 2: 0
SUM OF ALL RESPONSES TO PHQ QUESTIONS 1-9: 0
SUM OF ALL RESPONSES TO PHQ QUESTIONS 1-9: 0

## 2024-01-31 ASSESSMENT — ENCOUNTER SYMPTOMS
WHEEZING: 0
SORE THROAT: 0
SHORTNESS OF BREATH: 0
ABDOMINAL PAIN: 0

## 2024-01-31 NOTE — PROGRESS NOTES
Subjective:     Patient ID: Emilia Turcios is a 33 y.o. female    ADHD  Pertinent negatives include no abdominal pain, chest pain, congestion, fever, rash or sore throat.   Hypertension  Associated symptoms include palpitations. Pertinent negatives include no chest pain or shortness of breath.   Gastroesophageal Reflux  She reports no abdominal pain, no chest pain, no sore throat or no wheezing.   Palpitations   Pertinent negatives include no anxiety, chest pain, fever or shortness of breath.     Patient returns today to review her ADHD hypertension gastroesophageal reflux and palpitations.  She went off the Adderall for a while to see it would make a difference.  Her boyfriend definitely did not notice a difference and she would like to get restarted on it.  Denies any side effects associated with the Adderall medication.  Her palpitations are no worse on the stimulant than they have been in the past  Hypertensions been under good control with labetalol hydrochlorothiazide and spironolactone.  Denies any chest pain shortness of breath.  She does use caffeine and that she drinks pop when she is working 3 days a week  Palpitations are not an issue at this point.  We reviewed her 2D echo which showed a thickened mitral valve leaflet but ejection fraction is normal.  Holter monitor was normal as well    She is working out trying to do it on a regular basis.  Overall feels well      Review of Systems   Constitutional:  Negative for fever.   HENT:  Negative for congestion, ear pain and sore throat.    Respiratory:  Negative for shortness of breath and wheezing.    Cardiovascular:  Positive for palpitations. Negative for chest pain and leg swelling.   Gastrointestinal:  Negative for abdominal pain.   Genitourinary:  Negative for dysuria.   Skin:  Negative for rash.   Neurological:  Negative for syncope.   Hematological:  Negative for adenopathy.   Psychiatric/Behavioral:  Negative for sleep disturbance. The patient is not

## 2024-05-02 NOTE — PROGRESS NOTES
[facility]  [address] [place] [address]  Phone: [phone]       Name: Emilia Turcios  : 1990     Chief Complaint:    Emilia Turcios is a 34 y.o. year old female who presents today for   Chief Complaint   Patient presents with    ADHD     Possible increase in medication     Hypertension    Gastroesophageal Reflux    3 Month Follow-Up       History of Present Illness:    Patient here to establish care as a new patient.   Previous PCP: Dr. Floyd   Last appt with previous PCP 24  Specialists: ob/gyn - [facility]   Records reviewed from: EMR  OARRS report reviewed: adderall from [facility]. No signs of abuse.     Chronic conditions and associated medications. Taken from my review of the electronic chart, discussion with patient, and any records available to me at the time of visit and prior to the visit:    ADHD - Adderall XR 5mg - she has been on medication for about 1 year. It helps some, but not quite enough. Was planning to increase dose with previous PCP.   HTN - labetalol, hctz and spironolactone. Sounds like she recently held stimulant to see if HTN improved but there was no change. Currently on OCPs for birth control.       Medications:    Outpatient Medications Prior to Visit   Medication Sig Dispense Refill    spironolactone (ALDACTONE) 25 MG tablet Take 1 tablet by mouth daily 90 tablet 1    labetalol (NORMODYNE) 200 MG tablet Take 1 tablet by mouth daily 90 tablet 1    hydroCHLOROthiazide 12.5 MG tablet TAKE 1 TABLET BY MOUTH ONCE DAILY. 90 tablet 1    esomeprazole (NEXIUM) 20 MG delayed release capsule TAKE ONE CAPSULE BY MOUTH ONE TIME A DAY 90 capsule 1    norethindrone-ethinyl estradiol (JUNEL FE 1/20) 1-20 MG-MCG per tablet Take 1 tablet by mouth daily CONTINUOUS CYCLING 3 packet 3    fluticasone (FLONASE) 50 MCG/ACT nasal spray 2 sprays by Each Nostril route daily 16 g 0    Multiple Vitamins-Minerals (THERAPEUTIC MULTIVITAMIN-MINERALS) tablet Take 1 tablet by

## 2024-05-05 SDOH — HEALTH STABILITY: PHYSICAL HEALTH: ON AVERAGE, HOW MANY DAYS PER WEEK DO YOU ENGAGE IN MODERATE TO STRENUOUS EXERCISE (LIKE A BRISK WALK)?: 3 DAYS

## 2024-05-05 SDOH — HEALTH STABILITY: PHYSICAL HEALTH: ON AVERAGE, HOW MANY MINUTES DO YOU ENGAGE IN EXERCISE AT THIS LEVEL?: 40 MIN

## 2024-05-06 ENCOUNTER — OFFICE VISIT (OUTPATIENT)
Dept: PRIMARY CARE CLINIC | Age: 34
End: 2024-05-06
Payer: COMMERCIAL

## 2024-05-06 VITALS
WEIGHT: 174 LBS | DIASTOLIC BLOOD PRESSURE: 80 MMHG | SYSTOLIC BLOOD PRESSURE: 112 MMHG | BODY MASS INDEX: 26.37 KG/M2 | OXYGEN SATURATION: 97 % | HEART RATE: 83 BPM | HEIGHT: 68 IN

## 2024-05-06 DIAGNOSIS — F90.2 ATTENTION DEFICIT HYPERACTIVITY DISORDER (ADHD), COMBINED TYPE: Primary | ICD-10-CM

## 2024-05-06 DIAGNOSIS — I10 ESSENTIAL HYPERTENSION: Chronic | ICD-10-CM

## 2024-05-06 PROCEDURE — 3079F DIAST BP 80-89 MM HG: CPT | Performed by: FAMILY MEDICINE

## 2024-05-06 PROCEDURE — 99214 OFFICE O/P EST MOD 30 MIN: CPT | Performed by: FAMILY MEDICINE

## 2024-05-06 PROCEDURE — 3074F SYST BP LT 130 MM HG: CPT | Performed by: FAMILY MEDICINE

## 2024-05-06 RX ORDER — DEXTROAMPHETAMINE SACCHARATE, AMPHETAMINE ASPARTATE MONOHYDRATE, DEXTROAMPHETAMINE SULFATE AND AMPHETAMINE SULFATE 2.5; 2.5; 2.5; 2.5 MG/1; MG/1; MG/1; MG/1
10 CAPSULE, EXTENDED RELEASE ORAL EVERY MORNING
Qty: 30 CAPSULE | Refills: 0 | Status: SHIPPED | OUTPATIENT
Start: 2024-05-06 | End: 2024-06-05

## 2024-05-06 RX ORDER — DEXTROAMPHETAMINE SACCHARATE, AMPHETAMINE ASPARTATE MONOHYDRATE, DEXTROAMPHETAMINE SULFATE AND AMPHETAMINE SULFATE 2.5; 2.5; 2.5; 2.5 MG/1; MG/1; MG/1; MG/1
10 CAPSULE, EXTENDED RELEASE ORAL EVERY MORNING
Qty: 30 CAPSULE | Refills: 0 | Status: SHIPPED | OUTPATIENT
Start: 2024-07-03 | End: 2024-08-02

## 2024-05-06 RX ORDER — DEXTROAMPHETAMINE SACCHARATE, AMPHETAMINE ASPARTATE MONOHYDRATE, DEXTROAMPHETAMINE SULFATE AND AMPHETAMINE SULFATE 2.5; 2.5; 2.5; 2.5 MG/1; MG/1; MG/1; MG/1
10 CAPSULE, EXTENDED RELEASE ORAL EVERY MORNING
Qty: 30 CAPSULE | Refills: 0 | Status: SHIPPED | OUTPATIENT
Start: 2024-06-04 | End: 2024-07-04

## 2024-05-06 SDOH — ECONOMIC STABILITY: INCOME INSECURITY: HOW HARD IS IT FOR YOU TO PAY FOR THE VERY BASICS LIKE FOOD, HOUSING, MEDICAL CARE, AND HEATING?: NOT HARD AT ALL

## 2024-05-06 SDOH — ECONOMIC STABILITY: FOOD INSECURITY: WITHIN THE PAST 12 MONTHS, THE FOOD YOU BOUGHT JUST DIDN'T LAST AND YOU DIDN'T HAVE MONEY TO GET MORE.: NEVER TRUE

## 2024-05-06 SDOH — ECONOMIC STABILITY: FOOD INSECURITY: WITHIN THE PAST 12 MONTHS, YOU WORRIED THAT YOUR FOOD WOULD RUN OUT BEFORE YOU GOT MONEY TO BUY MORE.: NEVER TRUE

## 2024-05-18 LAB
CHOLEST SERPL-MCNC: 174 MG/DL (ref 0–199)
CHOLESTEROL/HDL RATIO: 5
GLUCOSE SERPL-MCNC: 96 MG/DL (ref 74–99)
HDLC SERPL-MCNC: 35 MG/DL
LDLC SERPL CALC-MCNC: 95 MG/DL (ref 0–100)
PATIENT FASTING?: YES
TRIGL SERPL-MCNC: 222 MG/DL
VLDLC SERPL CALC-MCNC: 44 MG/DL

## 2024-07-26 NOTE — PROGRESS NOTES
[facility]  [address] [place] [address] [place] [address]  Phone: [phone]       Name: Emilia Turcios  : 1990     Chief Complaint:    Emilia Turcios is a 34 y.o. year old female who presents today for   Chief Complaint   Patient presents with    ADHD     Increased Adderall last visit     3 Month Follow-Up       History of Present Illness:    ADHD - 3 month follow up. At last visit, I increased her adderall from 5 to 10mg. She feels like this change was helpful. Medication seems to be working well. No side effects of medication dose change.     Medications:    Outpatient Medications Prior to Visit   Medication Sig Dispense Refill    norethindrone-ethinyl estradiol (TARINA FE / EQ) 1-20 MG-MCG per tablet TAKE 1 TABLET BY MOUTH ONCE DAILY (CONTINUOUS CYCLING). 84 tablet 0    spironolactone (ALDACTONE) 25 MG tablet Take 1 tablet by mouth daily 90 tablet 1    labetalol (NORMODYNE) 200 MG tablet Take 1 tablet by mouth daily 90 tablet 1    hydroCHLOROthiazide 12.5 MG tablet TAKE 1 TABLET BY MOUTH ONCE DAILY. 90 tablet 1    esomeprazole (NEXIUM) 20 MG delayed release capsule TAKE ONE CAPSULE BY MOUTH ONE TIME A DAY 90 capsule 1    fluticasone (FLONASE) 50 MCG/ACT nasal spray 2 sprays by Each Nostril route daily 16 g 0    Multiple Vitamins-Minerals (THERAPEUTIC MULTIVITAMIN-MINERALS) tablet Take 1 tablet by mouth daily      Cetirizine HCl (ZYRTEC PO) Take by mouth      Probiotic Product (PROBIOTIC PO) Take by mouth      amphetamine-dextroamphetamine (ADDERALL XR) 10 MG extended release capsule Take 1 capsule by mouth every morning for 30 days. Max Daily Amount: 10 mg 30 capsule 0    amphetamine-dextroamphetamine (ADDERALL XR) 10 MG extended release capsule Take 1 capsule by mouth every morning for 30 days. Max Daily Amount: 10 mg 30 capsule 0    amphetamine-dextroamphetamine (ADDERALL XR) 10 MG extended release capsule Take 1 capsule by mouth every morning for 30 days. Max Daily Amount: 10 mg 30

## 2024-07-28 DIAGNOSIS — Z01.419 WOMEN'S ANNUAL ROUTINE GYNECOLOGICAL EXAMINATION: ICD-10-CM

## 2024-07-28 DIAGNOSIS — N94.6 DYSMENORRHEA: ICD-10-CM

## 2024-07-29 ENCOUNTER — OFFICE VISIT (OUTPATIENT)
Dept: PRIMARY CARE CLINIC | Age: 34
End: 2024-07-29
Payer: COMMERCIAL

## 2024-07-29 VITALS
HEIGHT: 68 IN | SYSTOLIC BLOOD PRESSURE: 116 MMHG | DIASTOLIC BLOOD PRESSURE: 80 MMHG | HEART RATE: 65 BPM | WEIGHT: 174 LBS | BODY MASS INDEX: 26.37 KG/M2 | OXYGEN SATURATION: 98 %

## 2024-07-29 DIAGNOSIS — F90.2 ATTENTION DEFICIT HYPERACTIVITY DISORDER (ADHD), COMBINED TYPE: Primary | ICD-10-CM

## 2024-07-29 PROCEDURE — 3074F SYST BP LT 130 MM HG: CPT | Performed by: FAMILY MEDICINE

## 2024-07-29 PROCEDURE — 3079F DIAST BP 80-89 MM HG: CPT | Performed by: FAMILY MEDICINE

## 2024-07-29 PROCEDURE — 99213 OFFICE O/P EST LOW 20 MIN: CPT | Performed by: FAMILY MEDICINE

## 2024-07-29 RX ORDER — NORETHINDRONE ACETATE AND ETHINYL ESTRADIOL AND FERROUS FUMARATE 1MG-20(21)
KIT ORAL
Qty: 84 TABLET | Refills: 0 | Status: SHIPPED | OUTPATIENT
Start: 2024-07-29

## 2024-07-29 RX ORDER — DEXTROAMPHETAMINE SACCHARATE, AMPHETAMINE ASPARTATE MONOHYDRATE, DEXTROAMPHETAMINE SULFATE AND AMPHETAMINE SULFATE 2.5; 2.5; 2.5; 2.5 MG/1; MG/1; MG/1; MG/1
10 CAPSULE, EXTENDED RELEASE ORAL EVERY MORNING
Qty: 30 CAPSULE | Refills: 0 | Status: SHIPPED | OUTPATIENT
Start: 2024-08-27 | End: 2024-09-26

## 2024-07-29 RX ORDER — DEXTROAMPHETAMINE SACCHARATE, AMPHETAMINE ASPARTATE MONOHYDRATE, DEXTROAMPHETAMINE SULFATE AND AMPHETAMINE SULFATE 2.5; 2.5; 2.5; 2.5 MG/1; MG/1; MG/1; MG/1
10 CAPSULE, EXTENDED RELEASE ORAL EVERY MORNING
Qty: 30 CAPSULE | Refills: 0 | Status: SHIPPED | OUTPATIENT
Start: 2024-07-29 | End: 2024-08-28

## 2024-07-29 RX ORDER — DEXTROAMPHETAMINE SACCHARATE, AMPHETAMINE ASPARTATE MONOHYDRATE, DEXTROAMPHETAMINE SULFATE AND AMPHETAMINE SULFATE 2.5; 2.5; 2.5; 2.5 MG/1; MG/1; MG/1; MG/1
10 CAPSULE, EXTENDED RELEASE ORAL EVERY MORNING
Qty: 30 CAPSULE | Refills: 0 | Status: SHIPPED | OUTPATIENT
Start: 2024-09-26 | End: 2024-10-26

## 2024-07-29 NOTE — TELEPHONE ENCOUNTER
Emilia Turcios is requesting a refill on 7/29/24.     Last Annual visit:  9/28/23  Next Office visit:  none scheduled     Called pt and  left message to return call to verify if pt is taking medication         Last prescribing provider:  Asha PARKS

## 2024-07-30 NOTE — TELEPHONE ENCOUNTER
Writer notified patient medication was sent, pt voiced understanding. Patient is scheduled for annual on 10/31/24.

## 2024-08-21 DIAGNOSIS — R00.2 PALPITATIONS: ICD-10-CM

## 2024-08-21 DIAGNOSIS — I10 ESSENTIAL HYPERTENSION: ICD-10-CM

## 2024-08-21 NOTE — TELEPHONE ENCOUNTER
Last Visit:  7/29/2024     Next Visit Date:  Future Appointments   Date Time Provider Department Center   10/31/2024  8:10 AM Evy Shin APRN - CNM [facility]   10/31/2024  9:30 AM Ayde Barney DO [place] [facility] DEP

## 2024-08-22 RX ORDER — SPIRONOLACTONE 25 MG/1
25 TABLET ORAL DAILY
Qty: 90 TABLET | Refills: 0 | Status: SHIPPED | OUTPATIENT
Start: 2024-08-22

## 2024-09-30 ENCOUNTER — OFFICE VISIT (OUTPATIENT)
Dept: PRIMARY CARE CLINIC | Age: 34
End: 2024-09-30
Payer: COMMERCIAL

## 2024-09-30 VITALS
OXYGEN SATURATION: 100 % | SYSTOLIC BLOOD PRESSURE: 118 MMHG | WEIGHT: 170.2 LBS | HEART RATE: 67 BPM | HEIGHT: 68 IN | BODY MASS INDEX: 25.79 KG/M2 | TEMPERATURE: 97.4 F | DIASTOLIC BLOOD PRESSURE: 72 MMHG

## 2024-09-30 DIAGNOSIS — L03.011 PARONYCHIA OF RIGHT MIDDLE FINGER: Primary | ICD-10-CM

## 2024-09-30 PROCEDURE — 3078F DIAST BP <80 MM HG: CPT

## 2024-09-30 PROCEDURE — 99213 OFFICE O/P EST LOW 20 MIN: CPT

## 2024-09-30 PROCEDURE — 3074F SYST BP LT 130 MM HG: CPT

## 2024-09-30 RX ORDER — CEPHALEXIN 500 MG/1
500 CAPSULE ORAL 3 TIMES DAILY
Qty: 21 CAPSULE | Refills: 0 | Status: SHIPPED | OUTPATIENT
Start: 2024-09-30 | End: 2024-10-07

## 2024-09-30 RX ORDER — MUPIROCIN 20 MG/G
OINTMENT TOPICAL
Qty: 1 G | Refills: 0 | Status: SHIPPED | OUTPATIENT
Start: 2024-09-30

## 2024-09-30 ASSESSMENT — PATIENT HEALTH QUESTIONNAIRE - PHQ9
SUM OF ALL RESPONSES TO PHQ QUESTIONS 1-9: 0
1. LITTLE INTEREST OR PLEASURE IN DOING THINGS: NOT AT ALL
SUM OF ALL RESPONSES TO PHQ QUESTIONS 1-9: 0
SUM OF ALL RESPONSES TO PHQ QUESTIONS 1-9: 0
SUM OF ALL RESPONSES TO PHQ9 QUESTIONS 1 & 2: 0
SUM OF ALL RESPONSES TO PHQ QUESTIONS 1-9: 0
2. FEELING DOWN, DEPRESSED OR HOPELESS: NOT AT ALL

## 2024-09-30 ASSESSMENT — ENCOUNTER SYMPTOMS: COLOR CHANGE: 1

## 2024-09-30 NOTE — PROGRESS NOTES
[facility]  [address] [facility] [facility] [address]    [facility]  [address],  [address]  [facility] [place] [address]  Dept: [phone]    Emilia Turcios is a 34 y.o. female Established patient, who presents to the walk-in clinic today with conditions/complaints as noted below:    Chief Complaint   Patient presents with    Other     Patient stated that she  pulled a hang nail on the right hand middle finger and it is not healing going on a week. Red, swollen, hot to the touch, painful. Has used bacitracin, and neosporin with a band aid at night, has not helped.         HPI:     Patient is a 34-year-old female that presents today with concerns for an infection of her right middle finger. Notes that she removed a hangnail two Sundays ago. Reports that she has since developed swelling along the nail border that's painful and tender. Endorses associated redness and warmth. She's been soaking her finger in warm salt water and applying Neosporin then covering with a bandaid at night. Mentions that she attempted to drain it yesterday but creating a small cut however it just bled. No fevers.        Past Medical History:   Diagnosis Date    Abnormal Pap smear of cervix Unknown    ADHD (attention deficit hyperactivity disorder) 2022    Anemia Unknown    Chlamydia 2021    Dislocation, hip (HCC)     Hypertension 6 years ago    Murmur     STD (sexually transmitted disease) 2021    Vasovagal near syncope        Current Outpatient Medications   Medication Sig Dispense Refill    cephALEXin (KEFLEX) 500 MG capsule Take 1 capsule by mouth 3 times daily for 7 days 21 capsule 0    mupirocin (BACTROBAN) 2 % ointment Apply topically 3 times daily for 5 days. 1 g 0    spironolactone (ALDACTONE) 25 MG tablet Take 1 tablet by mouth daily 90 tablet 0    norethindrone-ethinyl estradiol (TARINA FE

## 2024-09-30 NOTE — PATIENT INSTRUCTIONS
Finish the entire course of antibiotic treatment.  Apply Bactroban ointment after soaks.  Use Tylenol or Motrin as needed for pain.  Follow-up if worsening or no improvement.

## 2024-10-01 DIAGNOSIS — Z01.419 WOMEN'S ANNUAL ROUTINE GYNECOLOGICAL EXAMINATION: ICD-10-CM

## 2024-10-01 DIAGNOSIS — N94.6 DYSMENORRHEA: ICD-10-CM

## 2024-10-01 NOTE — TELEPHONE ENCOUNTER
Emilia Turcios is requesting a refill on 10/1/24.     Last Annual visit:  9/28/23  Next Office visit:  10/31/24        Last prescribing provider:  Asha PARKS

## 2024-10-03 RX ORDER — NORETHINDRONE ACETATE AND ETHINYL ESTRADIOL AND FERROUS FUMARATE 1MG-20(21)
KIT ORAL
Qty: 84 TABLET | Refills: 0 | Status: SHIPPED | OUTPATIENT
Start: 2024-10-03

## 2024-10-30 NOTE — PROGRESS NOTES
[facility]  [address] [place] [address]  Phone: [phone]       Name: Emilia Turcios  : 1990     Chief Complaint:    Emilia Turcios is a 34 y.o. year old female who presents today for   Chief Complaint   Patient presents with    ADHD    3 Month Follow-Up    Other     MILAD Ok       History of Present Illness:    Notes from past visit(s) and/or chart review: ADHD - stable on adderall XR 10mg     Subjective   History of Present Illness  The patient presents for evaluation of ADHD.    She reports that her ADHD medication is effective and she is tolerating the current dosage well. She experiences no side effects, maintains a regular eating pattern, and has a normal sleep schedule. She does not take the medication daily, often skipping it on weekends when she is less active.     She does not require any refills for his blood pressure medication at this time.    Medications:    Outpatient Medications Prior to Visit   Medication Sig Dispense Refill    norethindrone-ethinyl estradiol (TARINA FE / EQ) 1-20 MG-MCG per tablet Take 1 tablet by mouth daily 84 tablet 5    mupirocin (BACTROBAN) 2 % ointment Apply topically 3 times daily for 5 days. 1 g 0    spironolactone (ALDACTONE) 25 MG tablet Take 1 tablet by mouth daily 90 tablet 0    labetalol (NORMODYNE) 200 MG tablet Take 1 tablet by mouth daily 90 tablet 1    hydroCHLOROthiazide 12.5 MG tablet TAKE 1 TABLET BY MOUTH ONCE DAILY. 90 tablet 1    esomeprazole (NEXIUM) 20 MG delayed release capsule TAKE ONE CAPSULE BY MOUTH ONE TIME A DAY 90 capsule 1    fluticasone (FLONASE) 50 MCG/ACT nasal spray 2 sprays by Each Nostril route daily 16 g 0    Multiple Vitamins-Minerals (THERAPEUTIC MULTIVITAMIN-MINERALS) tablet Take 1 tablet by mouth daily      Cetirizine HCl (ZYRTEC PO) Take by mouth      Probiotic Product (PROBIOTIC PO) Take by mouth      amphetamine-dextroamphetamine (ADDERALL XR) 10 MG extended release capsule Take 1 capsule by mouth

## 2024-10-31 ENCOUNTER — HOSPITAL ENCOUNTER (OUTPATIENT)
Age: 34
Setting detail: SPECIMEN
Discharge: HOME | End: 2024-10-31

## 2024-10-31 ENCOUNTER — OFFICE VISIT (OUTPATIENT)
Dept: PRIMARY CARE CLINIC | Age: 34
End: 2024-10-31

## 2024-10-31 ENCOUNTER — OFFICE VISIT (OUTPATIENT)
Dept: OBGYN CLINIC | Age: 34
End: 2024-10-31
Payer: COMMERCIAL

## 2024-10-31 VITALS
SYSTOLIC BLOOD PRESSURE: 120 MMHG | WEIGHT: 173 LBS | OXYGEN SATURATION: 98 % | HEIGHT: 68 IN | DIASTOLIC BLOOD PRESSURE: 76 MMHG | HEART RATE: 89 BPM | BODY MASS INDEX: 26.22 KG/M2

## 2024-10-31 VITALS
SYSTOLIC BLOOD PRESSURE: 128 MMHG | BODY MASS INDEX: 25.94 KG/M2 | WEIGHT: 171.2 LBS | HEART RATE: 78 BPM | DIASTOLIC BLOOD PRESSURE: 84 MMHG | HEIGHT: 68 IN

## 2024-10-31 DIAGNOSIS — Z20.2 POSSIBLE EXPOSURE TO STD: ICD-10-CM

## 2024-10-31 DIAGNOSIS — F90.2 ATTENTION DEFICIT HYPERACTIVITY DISORDER (ADHD), COMBINED TYPE: Primary | ICD-10-CM

## 2024-10-31 DIAGNOSIS — Z01.419 WOMEN'S ANNUAL ROUTINE GYNECOLOGICAL EXAMINATION: Primary | ICD-10-CM

## 2024-10-31 DIAGNOSIS — Z12.4 CERVICAL CANCER SCREENING: ICD-10-CM

## 2024-10-31 DIAGNOSIS — Z30.41 ENCOUNTER FOR BIRTH CONTROL PILLS MAINTENANCE: ICD-10-CM

## 2024-10-31 DIAGNOSIS — Z01.419 WOMEN'S ANNUAL ROUTINE GYNECOLOGICAL EXAMINATION: ICD-10-CM

## 2024-10-31 DIAGNOSIS — N94.6 DYSMENORRHEA: ICD-10-CM

## 2024-10-31 PROCEDURE — 3079F DIAST BP 80-89 MM HG: CPT | Performed by: ADVANCED PRACTICE MIDWIFE

## 2024-10-31 PROCEDURE — 3074F SYST BP LT 130 MM HG: CPT | Performed by: ADVANCED PRACTICE MIDWIFE

## 2024-10-31 PROCEDURE — 99395 PREV VISIT EST AGE 18-39: CPT | Performed by: ADVANCED PRACTICE MIDWIFE

## 2024-10-31 RX ORDER — DEXTROAMPHETAMINE SACCHARATE, AMPHETAMINE ASPARTATE MONOHYDRATE, DEXTROAMPHETAMINE SULFATE AND AMPHETAMINE SULFATE 2.5; 2.5; 2.5; 2.5 MG/1; MG/1; MG/1; MG/1
10 CAPSULE, EXTENDED RELEASE ORAL EVERY MORNING
Qty: 30 CAPSULE | Refills: 0 | Status: SHIPPED | OUTPATIENT
Start: 2024-10-31 | End: 2024-11-30

## 2024-10-31 RX ORDER — DEXTROAMPHETAMINE SACCHARATE, AMPHETAMINE ASPARTATE MONOHYDRATE, DEXTROAMPHETAMINE SULFATE AND AMPHETAMINE SULFATE 2.5; 2.5; 2.5; 2.5 MG/1; MG/1; MG/1; MG/1
10 CAPSULE, EXTENDED RELEASE ORAL EVERY MORNING
Qty: 30 CAPSULE | Refills: 0 | Status: SHIPPED | OUTPATIENT
Start: 2024-11-29 | End: 2024-12-29

## 2024-10-31 RX ORDER — NORETHINDRONE ACETATE AND ETHINYL ESTRADIOL 1MG-20(21)
1 KIT ORAL DAILY
Qty: 84 TABLET | Refills: 5 | Status: SHIPPED | OUTPATIENT
Start: 2024-10-31

## 2024-10-31 RX ORDER — DEXTROAMPHETAMINE SACCHARATE, AMPHETAMINE ASPARTATE MONOHYDRATE, DEXTROAMPHETAMINE SULFATE AND AMPHETAMINE SULFATE 2.5; 2.5; 2.5; 2.5 MG/1; MG/1; MG/1; MG/1
10 CAPSULE, EXTENDED RELEASE ORAL EVERY MORNING
Qty: 30 CAPSULE | Refills: 0 | Status: SHIPPED | OUTPATIENT
Start: 2024-12-27 | End: 2025-01-26

## 2024-10-31 SDOH — ECONOMIC STABILITY: FOOD INSECURITY: WITHIN THE PAST 12 MONTHS, YOU WORRIED THAT YOUR FOOD WOULD RUN OUT BEFORE YOU GOT MONEY TO BUY MORE.: NEVER TRUE

## 2024-10-31 SDOH — ECONOMIC STABILITY: FOOD INSECURITY: WITHIN THE PAST 12 MONTHS, THE FOOD YOU BOUGHT JUST DIDN'T LAST AND YOU DIDN'T HAVE MONEY TO GET MORE.: NEVER TRUE

## 2024-10-31 SDOH — ECONOMIC STABILITY: INCOME INSECURITY: HOW HARD IS IT FOR YOU TO PAY FOR THE VERY BASICS LIKE FOOD, HOUSING, MEDICAL CARE, AND HEATING?: NOT HARD AT ALL

## 2024-10-31 ASSESSMENT — ANXIETY QUESTIONNAIRES
4. TROUBLE RELAXING: NOT AT ALL
2. NOT BEING ABLE TO STOP OR CONTROL WORRYING: NOT AT ALL
1. FEELING NERVOUS, ANXIOUS, OR ON EDGE: NOT AT ALL
5. BEING SO RESTLESS THAT IT IS HARD TO SIT STILL: NOT AT ALL
3. WORRYING TOO MUCH ABOUT DIFFERENT THINGS: NOT AT ALL
6. BECOMING EASILY ANNOYED OR IRRITABLE: NOT AT ALL
7. FEELING AFRAID AS IF SOMETHING AWFUL MIGHT HAPPEN: NOT AT ALL
GAD7 TOTAL SCORE: 0
IF YOU CHECKED OFF ANY PROBLEMS ON THIS QUESTIONNAIRE, HOW DIFFICULT HAVE THESE PROBLEMS MADE IT FOR YOU TO DO YOUR WORK, TAKE CARE OF THINGS AT HOME, OR GET ALONG WITH OTHER PEOPLE: NOT DIFFICULT AT ALL

## 2024-10-31 ASSESSMENT — PATIENT HEALTH QUESTIONNAIRE - PHQ9
SUM OF ALL RESPONSES TO PHQ QUESTIONS 1-9: 0
1. LITTLE INTEREST OR PLEASURE IN DOING THINGS: NOT AT ALL
SUM OF ALL RESPONSES TO PHQ QUESTIONS 1-9: 0
2. FEELING DOWN, DEPRESSED OR HOPELESS: NOT AT ALL
SUM OF ALL RESPONSES TO PHQ9 QUESTIONS 1 & 2: 0
SUM OF ALL RESPONSES TO PHQ QUESTIONS 1-9: 0
SUM OF ALL RESPONSES TO PHQ QUESTIONS 1-9: 0

## 2024-10-31 ASSESSMENT — ENCOUNTER SYMPTOMS
EYES NEGATIVE: 1
RESPIRATORY NEGATIVE: 1
ALLERGIC/IMMUNOLOGIC NEGATIVE: 1
GASTROINTESTINAL NEGATIVE: 1

## 2024-10-31 NOTE — PROGRESS NOTES
[facility]  [address]   [address]  [facility] [address]  Dept: [phone]    Patient Name: Emilia Turcios  Patient Age: 34 y.o.  Date of Visit: 10/31/2024    Subjective  Chief Complaint   Patient presents with    Annual Exam     HPI:  Emilia Turcios is a 34 y.o. female who arrives to office as an established patient for annual exam, pap, and med refill.     Patient reports she and her  had warts on their hands this summer and it was recommended she be checked during her GYN exam.  Patient reports is sexually active with 1 male partner.  Patient denies pain with sex.  Patient has a history of sexually transmitted infection(s).  Patient does want screening for sexually transmitted infection(s).  Reports is  on contraception - OCP for menses.     HTN is considered controlled with labetalol, HCTZ, spironolactone.   Does continuous cycling OCP x 14 years. No BTB.       Review of Systems   Constitutional: Negative.  Negative for chills, fatigue, fever and unexpected weight change.   HENT: Negative.     Eyes: Negative.    Respiratory: Negative.     Cardiovascular: Negative.    Gastrointestinal: Negative.    Endocrine: Negative.  Negative for cold intolerance and heat intolerance.   Genitourinary:  Negative for dyspareunia, dysuria, flank pain, frequency, menstrual problem (no issues with PRESTON continuous cycling), pelvic pain, vaginal discharge and vaginal pain.   Musculoskeletal: Negative.    Skin: Negative.    Allergic/Immunologic: Negative.    Neurological: Negative.    Hematological: Negative.    Psychiatric/Behavioral: Negative.         Preventive Health Screening:   Date of last pap: normal 9/28/23  History of abnormal pap: hx prior to 2021 - LSIL 2016 noted in record  HPV typing/date: negative 2023  Date of last mammogram: N/A   Date of last DEXA scan: N/A   Date of last colonoscopy (start age 44):

## 2024-11-01 LAB
C TRACH DNA SPEC QL PROBE+SIG AMP: NEGATIVE
N GONORRHOEA DNA SPEC QL PROBE+SIG AMP: NEGATIVE
SPECIMEN DESCRIPTION: NORMAL

## 2024-11-08 LAB — CYTOLOGY REPORT: NORMAL

## 2024-11-19 DIAGNOSIS — R00.2 PALPITATIONS: ICD-10-CM

## 2024-11-19 DIAGNOSIS — I10 ESSENTIAL HYPERTENSION: ICD-10-CM

## 2024-11-19 NOTE — TELEPHONE ENCOUNTER
LAST VISIT:   10/31/2024     Future Appointments   Date Time Provider Department Center   1/31/2025  8:15 AM Ayde Barney DO [place] [facility] ECC DEP

## 2024-11-20 RX ORDER — SPIRONOLACTONE 25 MG/1
25 TABLET ORAL DAILY
Qty: 90 TABLET | Refills: 0 | Status: SHIPPED | OUTPATIENT
Start: 2024-11-20

## 2024-12-01 DIAGNOSIS — Z30.41 ENCOUNTER FOR BIRTH CONTROL PILLS MAINTENANCE: ICD-10-CM

## 2024-12-01 DIAGNOSIS — N94.6 DYSMENORRHEA: ICD-10-CM

## 2024-12-02 DIAGNOSIS — K21.9 GASTROESOPHAGEAL REFLUX DISEASE WITHOUT ESOPHAGITIS: ICD-10-CM

## 2024-12-02 NOTE — TELEPHONE ENCOUNTER
Emilia Turcios is requesting a refill on norethindrone-ethinyl estradiol (TARINA FE 1/20 EQ) 1-20 MG-MCG per tablet.     Last Annual visit:  10/31/24  Next Office visit:  not scheduled        Last prescribing provider:  Evy CORTEZ

## 2024-12-04 DIAGNOSIS — I10 ESSENTIAL HYPERTENSION: ICD-10-CM

## 2024-12-04 DIAGNOSIS — K21.9 GASTROESOPHAGEAL REFLUX DISEASE WITHOUT ESOPHAGITIS: ICD-10-CM

## 2024-12-04 RX ORDER — NORETHINDRONE ACETATE AND ETHINYL ESTRADIOL AND FERROUS FUMARATE 1MG-20(21)
KIT ORAL
Qty: 84 TABLET | Refills: 0 | OUTPATIENT
Start: 2024-12-04

## 2024-12-04 RX ORDER — LABETALOL 200 MG/1
200 TABLET, FILM COATED ORAL DAILY
Qty: 90 TABLET | Refills: 1 | Status: SHIPPED | OUTPATIENT
Start: 2024-12-04 | End: 2025-06-02

## 2024-12-04 RX ORDER — HYDROCHLOROTHIAZIDE 12.5 MG/1
TABLET ORAL
Qty: 90 TABLET | Refills: 1 | Status: SHIPPED | OUTPATIENT
Start: 2024-12-04

## 2024-12-04 NOTE — TELEPHONE ENCOUNTER
Emilia Turcios is requesting a refill on the following medication(s):  Requested Prescriptions     Pending Prescriptions Disp Refills    esomeprazole (NEXIUM) 20 MG delayed release capsule 90 capsule 1     Sig: TAKE ONE CAPSULE BY MOUTH ONE TIME A DAY       Last Visit Date (If Applicable):  10/31/2024    Next Visit Date:    1/31/2025

## 2024-12-04 NOTE — TELEPHONE ENCOUNTER
Emilia Turcios is requesting a refill on the following medication(s):  Requested Prescriptions     Pending Prescriptions Disp Refills    labetalol (NORMODYNE) 200 MG tablet 90 tablet 1     Sig: Take 1 tablet by mouth daily       Last Visit Date (If Applicable):  10/31/2024    Next Visit Date:    1/31/2025

## 2025-01-20 ENCOUNTER — OFFICE VISIT (OUTPATIENT)
Dept: PRIMARY CARE CLINIC | Age: 35
End: 2025-01-20
Payer: COMMERCIAL

## 2025-01-20 VITALS
OXYGEN SATURATION: 98 % | DIASTOLIC BLOOD PRESSURE: 76 MMHG | HEIGHT: 68 IN | SYSTOLIC BLOOD PRESSURE: 118 MMHG | WEIGHT: 171 LBS | BODY MASS INDEX: 25.91 KG/M2 | HEART RATE: 79 BPM

## 2025-01-20 DIAGNOSIS — F90.2 ATTENTION DEFICIT HYPERACTIVITY DISORDER (ADHD), COMBINED TYPE: Primary | ICD-10-CM

## 2025-01-20 PROCEDURE — 99213 OFFICE O/P EST LOW 20 MIN: CPT | Performed by: FAMILY MEDICINE

## 2025-01-20 PROCEDURE — 3078F DIAST BP <80 MM HG: CPT | Performed by: FAMILY MEDICINE

## 2025-01-20 PROCEDURE — 3074F SYST BP LT 130 MM HG: CPT | Performed by: FAMILY MEDICINE

## 2025-01-20 RX ORDER — DEXTROAMPHETAMINE SACCHARATE, AMPHETAMINE ASPARTATE MONOHYDRATE, DEXTROAMPHETAMINE SULFATE AND AMPHETAMINE SULFATE 2.5; 2.5; 2.5; 2.5 MG/1; MG/1; MG/1; MG/1
10 CAPSULE, EXTENDED RELEASE ORAL EVERY MORNING
Qty: 30 CAPSULE | Refills: 0 | Status: SHIPPED | OUTPATIENT
Start: 2025-02-18 | End: 2025-03-20

## 2025-01-20 RX ORDER — DEXTROAMPHETAMINE SACCHARATE, AMPHETAMINE ASPARTATE MONOHYDRATE, DEXTROAMPHETAMINE SULFATE AND AMPHETAMINE SULFATE 2.5; 2.5; 2.5; 2.5 MG/1; MG/1; MG/1; MG/1
10 CAPSULE, EXTENDED RELEASE ORAL EVERY MORNING
Qty: 30 CAPSULE | Refills: 0 | Status: SHIPPED | OUTPATIENT
Start: 2025-01-20 | End: 2025-02-19

## 2025-01-20 RX ORDER — DEXTROAMPHETAMINE SACCHARATE, AMPHETAMINE ASPARTATE MONOHYDRATE, DEXTROAMPHETAMINE SULFATE AND AMPHETAMINE SULFATE 2.5; 2.5; 2.5; 2.5 MG/1; MG/1; MG/1; MG/1
10 CAPSULE, EXTENDED RELEASE ORAL EVERY MORNING
Qty: 30 CAPSULE | Refills: 0 | Status: SHIPPED | OUTPATIENT
Start: 2025-03-17 | End: 2025-04-16

## 2025-01-20 SDOH — ECONOMIC STABILITY: FOOD INSECURITY: WITHIN THE PAST 12 MONTHS, THE FOOD YOU BOUGHT JUST DIDN'T LAST AND YOU DIDN'T HAVE MONEY TO GET MORE.: NEVER TRUE

## 2025-01-20 SDOH — ECONOMIC STABILITY: FOOD INSECURITY: WITHIN THE PAST 12 MONTHS, YOU WORRIED THAT YOUR FOOD WOULD RUN OUT BEFORE YOU GOT MONEY TO BUY MORE.: NEVER TRUE

## 2025-01-20 ASSESSMENT — PATIENT HEALTH QUESTIONNAIRE - PHQ9
1. LITTLE INTEREST OR PLEASURE IN DOING THINGS: NOT AT ALL
SUM OF ALL RESPONSES TO PHQ QUESTIONS 1-9: 0
SUM OF ALL RESPONSES TO PHQ QUESTIONS 1-9: 0
SUM OF ALL RESPONSES TO PHQ9 QUESTIONS 1 & 2: 0
2. FEELING DOWN, DEPRESSED OR HOPELESS: NOT AT ALL
SUM OF ALL RESPONSES TO PHQ QUESTIONS 1-9: 0
SUM OF ALL RESPONSES TO PHQ QUESTIONS 1-9: 0

## 2025-01-20 NOTE — PROGRESS NOTES
[facility]  [address] [place] [address]  Phone: [phone]       Name: Emilia Turcios  : 1990     Chief Complaint:    Emilia Turcios is a 34 y.o. year old female who presents today for   Chief Complaint   Patient presents with    ADHD    3 Month Follow-Up    Other     MILAD used last visit     Hearing Problem     Bilateral hearing issue- states not hearing loss but sounds different        History of Present Illness:      Subjective   History of Present Illness  The patient is a 34-year-old female here for a routine follow-up on her ADHD. She is on Adderall 10 mg extended release, and has been stable on this dose for quite some time. Her OARRS report is reviewed today with no concerning filling habits identified. She has an updated controlled substance agreement on file.    She reports that her ADHD symptoms are well-managed with the current medication regimen and reports she has no side effects from current regimen.     She describes a sensation of stuffiness in her ears, which she first noticed during a band concert held in an auditorium yesterday. The etiology of this symptom remains uncertain to her, as she is unsure whether it is weather-related or indicative of a cold. Would just like to have ears looked at while she is here today.       Medications:    Outpatient Medications Prior to Visit   Medication Sig Dispense Refill    esomeprazole (NEXIUM) 20 MG delayed release capsule TAKE ONE CAPSULE BY MOUTH ONE TIME A DAY 90 capsule 1    hydroCHLOROthiazide 12.5 MG tablet TAKE 1 TABLET BY MOUTH ONCE DAILY. 90 tablet 1    labetalol (NORMODYNE) 200 MG tablet Take 1 tablet by mouth daily 90 tablet 1    spironolactone (ALDACTONE) 25 MG tablet TAKE 1 TABLET BY MOUTH DAILY 90 tablet 0    norethindrone-ethinyl estradiol (TARINA FE 1/20 EQ) 1-20 MG-MCG per tablet Take 1 tablet by mouth daily 84 tablet 5    mupirocin (BACTROBAN) 2 % ointment Apply topically 3 times daily for 5 days. 1 g 0

## 2025-02-20 DIAGNOSIS — I10 ESSENTIAL HYPERTENSION: ICD-10-CM

## 2025-02-20 DIAGNOSIS — R00.2 PALPITATIONS: ICD-10-CM

## 2025-02-20 RX ORDER — SPIRONOLACTONE 25 MG/1
25 TABLET ORAL DAILY
Qty: 90 TABLET | Refills: 0 | Status: SHIPPED | OUTPATIENT
Start: 2025-02-20

## 2025-02-20 NOTE — TELEPHONE ENCOUNTER
Emilia Turcios is requesting a refill on the following medication(s):  Requested Prescriptions     Pending Prescriptions Disp Refills    spironolactone (ALDACTONE) 25 MG tablet [Pharmacy Med Name: SPIRONOLACTONE 25 MG TABLET] 90 tablet 0     Sig: TAKE 1 TABLET BY MOUTH DAILY       Last Visit Date (If Applicable):  1/20/2025    Next Visit Date:    4/14/2025

## 2025-03-21 NOTE — PROGRESS NOTES
[facility]  [address] [place] [address].  [place] [address]  Phone: [phone]  Fax: [phone]    Office Visit Note    Date of Visit: 2025   Patient Name: Emilia Turcios   Patient :  1990     ASSESSMENT/PLAN     1. Attention deficit hyperactivity disorder (ADHD), combined type  Overview:  Adderall xr 10mg qd  Assessment & Plan:  - CSA signed with PCP in May 2024  - current regimen adequately managing ADHD sx- continue without changes  - Adderall prescriptions for 3mo sent to pharmacy  Orders:  -     amphetamine-dextroamphetamine (ADDERALL XR) 10 MG extended release capsule; Take 1 capsule by mouth every morning for 30 days. Max Daily Amount: 10 mg, Disp-30 capsule, R-0Normal  -     amphetamine-dextroamphetamine (ADDERALL XR) 10 MG extended release capsule; Take 1 capsule by mouth every morning for 30 days. Max Daily Amount: 10 mg, Disp-30 capsule, R-0Normal  -     amphetamine-dextroamphetamine (ADDERALL XR) 10 MG extended release capsule; Take 1 capsule by mouth every morning for 30 days. Max Daily Amount: 10 mg, Disp-30 capsule, R-0Normal  -     POCT Rapid Drug Screen  2. Essential hypertension  Overview:  Spironolactone, hctz and labetalol   Assessment & Plan:  - stable. Continue current treatment plan  3. Overweight (BMI 25.0-29.9)  4. Never smoked tobacco    .  There are no Patient Instructions on file for this visit.     Return in about 3 months (around 2025) for ADHD med check.    COMMUNICATION   Questions/concerns answered. Patient verbalized and expressed understanding. Medications, laboratory testing, imaging, consultation, and follow up as documented in this encounter.     The patient (or guardian, if applicable) and other individuals in attendance with the patient were advised that Artificial Intelligence will be utilized during this visit to record, process the conversation to generate a clinical note and to support improvement of the AI technology. The patient (or guardian,

## 2025-04-14 ENCOUNTER — OFFICE VISIT (OUTPATIENT)
Dept: PRIMARY CARE CLINIC | Age: 35
End: 2025-04-14
Payer: COMMERCIAL

## 2025-04-14 VITALS
HEART RATE: 74 BPM | HEIGHT: 68 IN | BODY MASS INDEX: 26.67 KG/M2 | OXYGEN SATURATION: 97 % | WEIGHT: 176 LBS | SYSTOLIC BLOOD PRESSURE: 114 MMHG | DIASTOLIC BLOOD PRESSURE: 70 MMHG

## 2025-04-14 DIAGNOSIS — F90.2 ATTENTION DEFICIT HYPERACTIVITY DISORDER (ADHD), COMBINED TYPE: Primary | ICD-10-CM

## 2025-04-14 DIAGNOSIS — I10 ESSENTIAL HYPERTENSION: Chronic | ICD-10-CM

## 2025-04-14 DIAGNOSIS — Z78.9 NEVER SMOKED TOBACCO: ICD-10-CM

## 2025-04-14 DIAGNOSIS — E66.3 OVERWEIGHT (BMI 25.0-29.9): ICD-10-CM

## 2025-04-14 LAB
ALCOHOL URINE: NEGATIVE
AMPHETAMINE SCREEN URINE: NORMAL
BARBITURATE SCREEN URINE: NEGATIVE
BENZODIAZEPINE SCREEN, URINE: NEGATIVE
BUPRENORPHINE URINE: NEGATIVE
COCAINE METABOLITE SCREEN URINE: NEGATIVE
FENTANYL SCREEN, URINE: NEGATIVE
GABAPENTIN SCREEN, URINE: NORMAL
MDMA, URINE: NEGATIVE
METHADONE SCREEN, URINE: NEGATIVE
METHAMPHETAMINE, URINE: NEGATIVE
OPIATE SCREEN URINE: NEGATIVE
OXYCODONE SCREEN URINE: NEGATIVE
PHENCYCLIDINE SCREEN URINE: NEGATIVE
PROPOXYPHENE SCREEN, URINE: NEGATIVE
SYNTHETIC CANNABINOIDS(K2) SCREEN, URINE: NORMAL
THC SCREEN, URINE: NEGATIVE
TRAMADOL SCREEN URINE: NORMAL
TRICYCLIC ANTIDEPRESSANTS, UR: NEGATIVE

## 2025-04-14 PROCEDURE — 3078F DIAST BP <80 MM HG: CPT | Performed by: STUDENT IN AN ORGANIZED HEALTH CARE EDUCATION/TRAINING PROGRAM

## 2025-04-14 PROCEDURE — 80305 DRUG TEST PRSMV DIR OPT OBS: CPT | Performed by: STUDENT IN AN ORGANIZED HEALTH CARE EDUCATION/TRAINING PROGRAM

## 2025-04-14 PROCEDURE — 3074F SYST BP LT 130 MM HG: CPT | Performed by: STUDENT IN AN ORGANIZED HEALTH CARE EDUCATION/TRAINING PROGRAM

## 2025-04-14 PROCEDURE — 99214 OFFICE O/P EST MOD 30 MIN: CPT | Performed by: STUDENT IN AN ORGANIZED HEALTH CARE EDUCATION/TRAINING PROGRAM

## 2025-04-14 RX ORDER — DEXTROAMPHETAMINE SACCHARATE, AMPHETAMINE ASPARTATE MONOHYDRATE, DEXTROAMPHETAMINE SULFATE AND AMPHETAMINE SULFATE 2.5; 2.5; 2.5; 2.5 MG/1; MG/1; MG/1; MG/1
10 CAPSULE, EXTENDED RELEASE ORAL EVERY MORNING
Qty: 30 CAPSULE | Refills: 0 | Status: SHIPPED | OUTPATIENT
Start: 2025-05-14 | End: 2025-06-13

## 2025-04-14 RX ORDER — DEXTROAMPHETAMINE SACCHARATE, AMPHETAMINE ASPARTATE MONOHYDRATE, DEXTROAMPHETAMINE SULFATE AND AMPHETAMINE SULFATE 2.5; 2.5; 2.5; 2.5 MG/1; MG/1; MG/1; MG/1
10 CAPSULE, EXTENDED RELEASE ORAL EVERY MORNING
Qty: 30 CAPSULE | Refills: 0 | Status: SHIPPED | OUTPATIENT
Start: 2025-04-14 | End: 2025-05-14

## 2025-04-14 RX ORDER — DEXTROAMPHETAMINE SACCHARATE, AMPHETAMINE ASPARTATE MONOHYDRATE, DEXTROAMPHETAMINE SULFATE AND AMPHETAMINE SULFATE 2.5; 2.5; 2.5; 2.5 MG/1; MG/1; MG/1; MG/1
10 CAPSULE, EXTENDED RELEASE ORAL EVERY MORNING
Qty: 30 CAPSULE | Refills: 0 | Status: SHIPPED | OUTPATIENT
Start: 2025-06-14 | End: 2025-07-14

## 2025-04-14 NOTE — ASSESSMENT & PLAN NOTE
- CSA signed with PCP in May 2024  - current regimen adequately managing ADHD sx- continue without changes  - Adderall prescriptions for 3mo sent to pharmacy

## 2025-04-16 LAB
CHOLEST SERPL-MCNC: 202 MG/DL (ref 0–199)
CHOLESTEROL/HDL RATIO: 4.3
GLUCOSE SERPL-MCNC: 98 MG/DL (ref 74–99)
HDLC SERPL-MCNC: 47 MG/DL
LDLC SERPL CALC-MCNC: 119 MG/DL (ref 0–100)
PATIENT FASTING?: YES
TRIGL SERPL-MCNC: 180 MG/DL
VLDLC SERPL CALC-MCNC: 36 MG/DL (ref 1–30)

## 2025-05-16 ENCOUNTER — TELEPHONE (OUTPATIENT)
Dept: PRIMARY CARE CLINIC | Age: 35
End: 2025-05-16

## 2025-05-24 DIAGNOSIS — R00.2 PALPITATIONS: ICD-10-CM

## 2025-05-24 DIAGNOSIS — I10 ESSENTIAL HYPERTENSION: ICD-10-CM

## 2025-05-27 RX ORDER — SPIRONOLACTONE 25 MG/1
25 TABLET ORAL DAILY
Qty: 90 TABLET | Refills: 0 | Status: SHIPPED | OUTPATIENT
Start: 2025-05-27

## 2025-05-27 NOTE — TELEPHONE ENCOUNTER
LAST VISIT:   4/14/2025     Future Appointments   Date Time Provider Department Center   7/17/2025  9:45 AM Ayde Barney DO [place] [facility] ECC DEP

## 2025-06-13 DIAGNOSIS — I10 ESSENTIAL HYPERTENSION: ICD-10-CM

## 2025-06-13 RX ORDER — HYDROCHLOROTHIAZIDE 12.5 MG/1
TABLET ORAL
Qty: 90 TABLET | Refills: 1 | Status: SHIPPED | OUTPATIENT
Start: 2025-06-13

## 2025-06-13 RX ORDER — LABETALOL 200 MG/1
200 TABLET, FILM COATED ORAL DAILY
Qty: 90 TABLET | Refills: 1 | Status: SHIPPED | OUTPATIENT
Start: 2025-06-13 | End: 2025-12-10

## 2025-06-13 NOTE — TELEPHONE ENCOUNTER
LAST VISIT:   4/14/2025     Future Appointments   Date Time Provider Department Center   7/17/2025  9:45 AM Ayde Barney DO [place] [facility] ECC DEP

## 2025-07-17 ENCOUNTER — OFFICE VISIT (OUTPATIENT)
Dept: PRIMARY CARE CLINIC | Age: 35
End: 2025-07-17
Payer: COMMERCIAL

## 2025-07-17 VITALS
DIASTOLIC BLOOD PRESSURE: 78 MMHG | SYSTOLIC BLOOD PRESSURE: 102 MMHG | HEART RATE: 82 BPM | RESPIRATION RATE: 18 BRPM | WEIGHT: 179 LBS | OXYGEN SATURATION: 98 % | HEIGHT: 68 IN | BODY MASS INDEX: 27.13 KG/M2

## 2025-07-17 DIAGNOSIS — F90.2 ATTENTION DEFICIT HYPERACTIVITY DISORDER (ADHD), COMBINED TYPE: Primary | ICD-10-CM

## 2025-07-17 DIAGNOSIS — I10 ESSENTIAL HYPERTENSION: Chronic | ICD-10-CM

## 2025-07-17 PROCEDURE — 3074F SYST BP LT 130 MM HG: CPT | Performed by: FAMILY MEDICINE

## 2025-07-17 PROCEDURE — 3078F DIAST BP <80 MM HG: CPT | Performed by: FAMILY MEDICINE

## 2025-07-17 PROCEDURE — 99214 OFFICE O/P EST MOD 30 MIN: CPT | Performed by: FAMILY MEDICINE

## 2025-07-17 RX ORDER — DEXTROAMPHETAMINE SACCHARATE, AMPHETAMINE ASPARTATE MONOHYDRATE, DEXTROAMPHETAMINE SULFATE AND AMPHETAMINE SULFATE 2.5; 2.5; 2.5; 2.5 MG/1; MG/1; MG/1; MG/1
10 CAPSULE, EXTENDED RELEASE ORAL EVERY MORNING
Qty: 30 CAPSULE | Refills: 0 | Status: SHIPPED | OUTPATIENT
Start: 2025-07-17 | End: 2025-08-16

## 2025-07-17 RX ORDER — DEXTROAMPHETAMINE SACCHARATE, AMPHETAMINE ASPARTATE MONOHYDRATE, DEXTROAMPHETAMINE SULFATE AND AMPHETAMINE SULFATE 2.5; 2.5; 2.5; 2.5 MG/1; MG/1; MG/1; MG/1
10 CAPSULE, EXTENDED RELEASE ORAL EVERY MORNING
Qty: 30 CAPSULE | Refills: 0 | Status: SHIPPED | OUTPATIENT
Start: 2025-08-15 | End: 2025-09-14

## 2025-07-17 RX ORDER — DEXTROAMPHETAMINE SACCHARATE, AMPHETAMINE ASPARTATE MONOHYDRATE, DEXTROAMPHETAMINE SULFATE AND AMPHETAMINE SULFATE 2.5; 2.5; 2.5; 2.5 MG/1; MG/1; MG/1; MG/1
10 CAPSULE, EXTENDED RELEASE ORAL EVERY MORNING
Qty: 30 CAPSULE | Refills: 0 | Status: SHIPPED | OUTPATIENT
Start: 2025-09-12 | End: 2025-10-12

## 2025-07-17 ASSESSMENT — PATIENT HEALTH QUESTIONNAIRE - PHQ9
1. LITTLE INTEREST OR PLEASURE IN DOING THINGS: NOT AT ALL
SUM OF ALL RESPONSES TO PHQ QUESTIONS 1-9: 0
SUM OF ALL RESPONSES TO PHQ QUESTIONS 1-9: 0
2. FEELING DOWN, DEPRESSED OR HOPELESS: NOT AT ALL
SUM OF ALL RESPONSES TO PHQ QUESTIONS 1-9: 0
SUM OF ALL RESPONSES TO PHQ QUESTIONS 1-9: 0

## 2025-07-17 NOTE — PROGRESS NOTES
[facility]  [address] [place] [address]  Phone: [phone]       Name: Emilia Turcios  : 1990     Chief Complaint:    Emilia Turcios is a 35 y.o. year old female who presents today for   Chief Complaint   Patient presents with    Medication Check    Medication Refill    3 Month Follow-Up       History of Present Illness:    Notes from past visit(s) and/or chart review:     ADHD - adderall XR 10mg. She reports that her current dosage of Adderall is effective and she is not experiencing any adverse effects.  HTN - spironolactone, hctz and labetalol. No AE. BP well controlled. No concerns with this condition.     Medications:    Outpatient Medications Prior to Visit   Medication Sig Dispense Refill    labetalol (NORMODYNE) 200 MG tablet Take 1 tablet by mouth daily 90 tablet 1    hydroCHLOROthiazide 12.5 MG tablet TAKE 1 TABLET BY MOUTH ONCE DAILY. 90 tablet 1    spironolactone (ALDACTONE) 25 MG tablet TAKE 1 TABLET BY MOUTH DAILY 90 tablet 0    esomeprazole (NEXIUM) 20 MG delayed release capsule TAKE ONE CAPSULE BY MOUTH ONE TIME A DAY 90 capsule 1    norethindrone-ethinyl estradiol (TARINA FE 1/20 EQ) 1-20 MG-MCG per tablet Take 1 tablet by mouth daily 84 tablet 5    mupirocin (BACTROBAN) 2 % ointment Apply topically 3 times daily for 5 days. 1 g 0    fluticasone (FLONASE) 50 MCG/ACT nasal spray 2 sprays by Each Nostril route daily 16 g 0    Multiple Vitamins-Minerals (THERAPEUTIC MULTIVITAMIN-MINERALS) tablet Take 1 tablet by mouth daily      Cetirizine HCl (ZYRTEC PO) Take by mouth      Probiotic Product (PROBIOTIC PO) Take by mouth      amphetamine-dextroamphetamine (ADDERALL XR) 10 MG extended release capsule Take 1 capsule by mouth every morning for 30 days. Max Daily Amount: 10 mg 30 capsule 0    amphetamine-dextroamphetamine (ADDERALL XR) 10 MG extended release capsule Take 1 capsule by mouth every morning for 30 days. Max Daily Amount: 10 mg 30 capsule 0

## 2025-09-04 DIAGNOSIS — R00.2 PALPITATIONS: ICD-10-CM

## 2025-09-04 DIAGNOSIS — I10 ESSENTIAL HYPERTENSION: ICD-10-CM

## 2025-09-04 RX ORDER — SPIRONOLACTONE 25 MG/1
25 TABLET ORAL DAILY
Qty: 90 TABLET | Refills: 0 | Status: SHIPPED | OUTPATIENT
Start: 2025-09-04